# Patient Record
Sex: FEMALE | Race: WHITE | NOT HISPANIC OR LATINO | Employment: FULL TIME | URBAN - METROPOLITAN AREA
[De-identification: names, ages, dates, MRNs, and addresses within clinical notes are randomized per-mention and may not be internally consistent; named-entity substitution may affect disease eponyms.]

---

## 2017-12-26 ENCOUNTER — HISTORICAL (OUTPATIENT)
Dept: ADMINISTRATIVE | Facility: HOSPITAL | Age: 22
End: 2017-12-26

## 2017-12-26 LAB
ALBUMIN SERPL-MCNC: 3.8 GM/DL (ref 3.4–5)
ALBUMIN/GLOB SERPL: 1.1 RATIO (ref 1.1–2)
ALP SERPL-CCNC: 55 UNIT/L (ref 38–126)
ALT SERPL-CCNC: 19 UNIT/L (ref 8–29)
AST SERPL-CCNC: 10 UNIT/L (ref 15–37)
BILIRUB SERPL-MCNC: 0.4 MG/DL (ref 0.2–1)
BILIRUBIN DIRECT+TOT PNL SERPL-MCNC: 0.1 MG/DL (ref 0–0.5)
BILIRUBIN DIRECT+TOT PNL SERPL-MCNC: 0.3 MG/DL (ref 0–0.8)
BUN SERPL-MCNC: 10 MG/DL (ref 7–18)
CALCIUM SERPL-MCNC: 9.4 MG/DL (ref 8.5–10.1)
CHLORIDE SERPL-SCNC: 105 MMOL/L (ref 98–107)
CO2 SERPL-SCNC: 27 MMOL/L (ref 21–32)
CREAT SERPL-MCNC: 0.6 MG/DL (ref 0.55–1.02)
ERYTHROCYTE [DISTWIDTH] IN BLOOD BY AUTOMATED COUNT: 12.7 % (ref 11.5–17)
GLOBULIN SER-MCNC: 3.4 GM/DL (ref 2.4–3.5)
GLUCOSE SERPL-MCNC: 90 MG/DL (ref 74–106)
HCT VFR BLD AUTO: 42.7 % (ref 37–47)
HGB BLD-MCNC: 13.7 GM/DL (ref 12–16)
MCH RBC QN AUTO: 29.2 PG (ref 27–31)
MCHC RBC AUTO-ENTMCNC: 32.1 GM/DL (ref 33–36)
MCV RBC AUTO: 91 FL (ref 80–94)
PLATELET # BLD AUTO: 183 X10(3)/MCL (ref 130–400)
PMV BLD AUTO: 10.1 FL (ref 9.4–12.4)
POTASSIUM SERPL-SCNC: 4.6 MMOL/L (ref 3.5–5.1)
PROT SERPL-MCNC: 7.2 GM/DL (ref 6.4–8.2)
RBC # BLD AUTO: 4.69 X10(6)/MCL (ref 4.2–5.4)
SODIUM SERPL-SCNC: 134 MMOL/L (ref 136–145)
TSH SERPL-ACNC: 0.88 MIU/ML (ref 0.36–3.74)
WBC # SPEC AUTO: 6.1 X10(3)/MCL (ref 4.5–11.5)

## 2018-10-23 ENCOUNTER — TELEPHONE (OUTPATIENT)
Dept: PSYCHIATRY | Facility: CLINIC | Age: 23
End: 2018-10-23

## 2018-10-23 NOTE — TELEPHONE ENCOUNTER
----- Message from Artem Jackson sent at 10/22/2018  4:17 PM CDT -----  Contact: Mother [phone]  Would like to schedule new patient appointment.  Please call back at [phone]. Md Ron

## 2018-10-23 NOTE — TELEPHONE ENCOUNTER
Called and spoke to patient mansoor. Schedules for an [facility] Dr[facility].
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
## 2019-01-02 ENCOUNTER — LAB VISIT (OUTPATIENT)
Dept: LAB | Facility: HOSPITAL | Age: 24
End: 2019-01-02
Attending: PSYCHIATRY & NEUROLOGY
Payer: COMMERCIAL

## 2019-01-02 ENCOUNTER — OFFICE VISIT (OUTPATIENT)
Dept: PSYCHIATRY | Facility: CLINIC | Age: 24
End: 2019-01-02
Payer: COMMERCIAL

## 2019-01-02 DIAGNOSIS — F41.0 GENERALIZED ANXIETY DISORDER WITH PANIC ATTACKS: Primary | ICD-10-CM

## 2019-01-02 DIAGNOSIS — F32.0 CURRENT MILD EPISODE OF MAJOR DEPRESSIVE DISORDER WITHOUT PRIOR EPISODE: ICD-10-CM

## 2019-01-02 DIAGNOSIS — F41.1 GENERALIZED ANXIETY DISORDER WITH PANIC ATTACKS: Primary | ICD-10-CM

## 2019-01-02 LAB
ALBUMIN SERPL BCP-MCNC: 3.4 G/DL
ALP SERPL-CCNC: 55 U/L
ALT SERPL W/O P-5'-P-CCNC: 14 U/L
ANION GAP SERPL CALC-SCNC: 10 MMOL/L
AST SERPL-CCNC: 14 U/L
BASOPHILS # BLD AUTO: 0.04 K/UL
BASOPHILS NFR BLD: 0.6 %
BILIRUB SERPL-MCNC: 0.3 MG/DL
BUN SERPL-MCNC: 7 MG/DL
CALCIUM SERPL-MCNC: 9.5 MG/DL
CHLORIDE SERPL-SCNC: 106 MMOL/L
CO2 SERPL-SCNC: 22 MMOL/L
CREAT SERPL-MCNC: 0.7 MG/DL
DIFFERENTIAL METHOD: ABNORMAL
EOSINOPHIL # BLD AUTO: 0.1 K/UL
EOSINOPHIL NFR BLD: 1.2 %
ERYTHROCYTE [DISTWIDTH] IN BLOOD BY AUTOMATED COUNT: 12 %
EST. GFR  (AFRICAN AMERICAN): >60 ML/MIN/1.73 M^2
EST. GFR  (NON AFRICAN AMERICAN): >60 ML/MIN/1.73 M^2
GLUCOSE SERPL-MCNC: 88 MG/DL
HCT VFR BLD AUTO: 42.8 %
HGB BLD-MCNC: 13.6 G/DL
IMM GRANULOCYTES # BLD AUTO: 0.02 K/UL
IMM GRANULOCYTES NFR BLD AUTO: 0.3 %
LYMPHOCYTES # BLD AUTO: 2.3 K/UL
LYMPHOCYTES NFR BLD: 36.1 %
MCH RBC QN AUTO: 29.6 PG
MCHC RBC AUTO-ENTMCNC: 31.8 G/DL
MCV RBC AUTO: 93 FL
MONOCYTES # BLD AUTO: 0.4 K/UL
MONOCYTES NFR BLD: 6.5 %
NEUTROPHILS # BLD AUTO: 3.6 K/UL
NEUTROPHILS NFR BLD: 55.3 %
NRBC BLD-RTO: 0 /100 WBC
PLATELET # BLD AUTO: 201 K/UL
PMV BLD AUTO: 11.4 FL
POTASSIUM SERPL-SCNC: 4.6 MMOL/L
PROT SERPL-MCNC: 7.1 G/DL
RBC # BLD AUTO: 4.59 M/UL
SODIUM SERPL-SCNC: 138 MMOL/L
TSH SERPL DL<=0.005 MIU/L-ACNC: 0.55 UIU/ML
VIT B12 SERPL-MCNC: 523 PG/ML
WBC # BLD AUTO: 6.43 K/UL

## 2019-01-02 PROCEDURE — 90792 PR PSYCHIATRIC DIAGNOSTIC EVALUATION W/MEDICAL SERVICES: ICD-10-PCS | Mod: S$GLB,,, | Performed by: PSYCHIATRY & NEUROLOGY

## 2019-01-02 PROCEDURE — 82607 VITAMIN B-12: CPT

## 2019-01-02 PROCEDURE — 36415 COLL VENOUS BLD VENIPUNCTURE: CPT | Mod: PO

## 2019-01-02 PROCEDURE — 85025 COMPLETE CBC W/AUTO DIFF WBC: CPT

## 2019-01-02 PROCEDURE — 90792 PSYCH DIAG EVAL W/MED SRVCS: CPT | Mod: S$GLB,,, | Performed by: PSYCHIATRY & NEUROLOGY

## 2019-01-02 PROCEDURE — 80053 COMPREHEN METABOLIC PANEL: CPT

## 2019-01-02 PROCEDURE — 84443 ASSAY THYROID STIM HORMONE: CPT

## 2019-01-02 RX ORDER — CITALOPRAM 20 MG/1
20 TABLET, FILM COATED ORAL
COMMUNITY
End: 2019-01-02

## 2019-01-02 RX ORDER — ESCITALOPRAM OXALATE 10 MG/1
10 TABLET ORAL DAILY
Qty: 30 TABLET | Refills: 11 | Status: SHIPPED | OUTPATIENT
Start: 2019-01-02 | End: 2019-08-13 | Stop reason: SDUPTHER

## 2019-01-02 RX ORDER — NORGESTIMATE AND ETHINYL ESTRADIOL 0.25-0.035
1 KIT ORAL DAILY
Refills: 10 | COMMUNITY
Start: 2018-11-18

## 2019-01-02 RX ORDER — ESCITALOPRAM OXALATE 10 MG/1
10 TABLET ORAL DAILY
COMMUNITY
End: 2019-01-02

## 2019-01-02 NOTE — PATIENT INSTRUCTIONS
Understanding Anxiety Disorders  Almost everyone gets nervous now and then. Its normal to have knots in your stomach before a test, or for your heart to race on a first date. But an anxiety disorder is much more than a case of nerves. In fact, its symptoms may be overwhelming. But treatment can relieve many of these symptoms. Talking to your healthcare provider is the first step.    What are anxiety disorders?  An anxiety disorder causes intense feelings of panic and fear. These feelings may arise for no apparent reason. And they tend to recur again and again. They may prevent you from coping with life and cause you great distress. As a result, you may avoid anything that triggers your fear. In extreme cases, you may never leave the house. Anxiety disorders may cause other symptoms, such as:  · Obsessive thoughts you cant control  · Constant nightmares or painful thoughts of the past  · Nausea, sweating, and muscle tension  · Trouble sleeping or concentrating  What causes anxiety disorders?  Anxiety disorders tend to run in families. For some people, childhood abuse or neglect may play a role. For others, stressful life events or trauma may trigger anxiety disorders. Anxiety can trigger low self-esteem and poor coping skills.  Common anxiety disorders  · Panic disorder. This causes an intense fear of being in danger.  · Phobias. These are extreme fears of certain objects, places, or events.  · Obsessive-compulsive disorder. This causes you to have unwanted thoughts and urges. You also may perform certain actions over and over.  · Posttraumatic stress disorder. This occurs in people who have survived a terrible ordeal. It can cause nightmares and flashbacks about the event.  · Generalized anxiety disorder. This causes constant worry that can greatly disrupt your life.   Getting better  You may believe that nothing can help you. Or, you might fear what others may think. But most anxiety symptoms can be eased.  Having an anxiety disorder is nothing to be ashamed of. Most people do best with treatment that combines medicine and therapy. These arent cures. But they can help you live a healthier life.  Date Last Reviewed: 2/1/2017 © 2000-2017 [employer]. [address] [place] [address], [place] [address]. All rights reserved. This information is not intended as a substitute for professional medical care. Always follow your healthcare professional's instructions.          ADHD and Your Family  Taking care of a child with ADHD might cause other relationships in the household to suffer. This doesnt have to happen. Each member of the family can help build lasting bonds. That way, life can get better for everyone.    How you may feel  If you have a child with ADHD, you may feel guilty, worried, and tired. Try to get enough rest and do some things you enjoy. Ask family and friends for support.  You and your partner  Its easy to blame each other. You may not agree on the childs diagnosis, treatment, or discipline. Finding answers isnt easy, but make an effort to talk each day. Now is the time to build new trust within your relationship.  Nurturing your other children  You may devote a lot of time and effort to the child with ADHD. As a result, your other children may feel left out. Do your best to spend time with your other children, too. Instead of using up your energy, you may find that these moments help build your reserves.  Parents role  · For yourself. Recharge and relax. Free up some time by finding a caregiver who understands ADHD. Ask a counselor or your support group about people who might be able to supervise your child.  · For your marriage. Try to respect any differing opinions. Also, spend time alone as a couple. Talk about things other than your child and coping with ADHD.  · For your other children. Do things with them. Ask about their hobbies, desires, and fears. Let them know they matter to you. Then  help them relate to the child with ADHD.  · Reward everyones efforts to act like a family.  · Counseling may help you manage your stress. It can also help strengthen your marriage and resolve family conflicts.  The future holds promise  Your childs ADHD symptoms are likely to change and evolve as he or she matures. But with time and ongoing guidance, your child can learn to manage his or her traits. Many adults with ADHD are happy and successful.   Date Last Reviewed: 12/1/2016 © 2000-2017 [employer]. [address] [place] [address], [place] [address]. All rights reserved. This information is not intended as a substitute for professional medical care. Always follow your healthcare professional's instructions.          Depression Affects Your Mind and Body  Everyone feels sad or blue from time to time for a few days or weeks. Depression is when these feelings don't go away and they interfere with daily life.  Depression is a real illness that can develop at any age. It is one of the most common mental health problems in the [place] Depression makes you feel sad, helpless, and hopeless. It gets in the way of your life and relationships. It inhibits your ability to think and act. But, with help, you can feel better again.     When I was depressed, I felt awful. I was so tired all the time I could hardly think, but at night I couldnt fall asleep. My head hurt. My stomach hurt. I didnt know what was wrong with me.   Depression affects your whole body  Brain chemicals affect your body as well as your mood. So depression may do more than just make you feel low. You may also feel bad physically. Depression can:  · Cause trouble with mental tasks such as remembering, concentrating, or making decisions  · Make you feel nervous and jumpy  · Cause trouble sleeping. Or you may sleep too much  · Change your appetite  · Cause headaches, stomachaches, or other aches and pains  · Drain your body of energy  Depression and  other illness  It is common for people who have chronic health problems to also have depression. It can often be hard to tell which one caused the other. A person might become depressed after finding out they have a health problem. But some studies suggest being depressed may make certain health problems more likely. And some depressed people stop taking care of themselves. This may make them more likely to get sick.  Date Last Reviewed: 1/1/2017  © 8585-0420 [employer]. [address] [place] [address], [place] [address]. All rights reserved. This information is not intended as a substitute for professional medical care. Always follow your healthcare professional's instructions.          Using Antidepressants  Depression is a mood disorder that affects the way you think and feel. The most common symptom is a feeling of deep sadness. This feeling does not go away or get better on its own. But most types of depression can be helped with therapy and antidepressant medicines. (Note: This covers antidepressant use in adults only.)    What do antidepressants do?  Antidepressants restore the balance of certain chemicals in your brain to help ease your depression. You will likely feel better in 4 to 6 weeks. But you may continue taking antidepressants for a year or more to keep your symptoms from coming back. Some people with depression need to take antidepressants for life. There are several types of antidepressants. The main types are described below.  Selective serotonin reuptake inhibitors (SSRIs)  SSRIs are the most effective medicines for the treatment of depression. They tend to have fewer side effects than other antidepressants. Possible side effects include anxiety, trouble sleeping, nausea, diarrhea, sexual dysfunction, and headaches. In rare cases, they may make you more depressed. SSRIs shouldnt be mixed with certain other medicines. Talk with your healthcare provider about all the medicines, herbs, and  supplements you are taking.  Tricyclic antidepressants  Tricyclics help severe or long-term depression. They have been used for many years with good results. Possible side effects include blurred vision, dry mouth, and constipation.  Monoamine oxidase inhibitors (MAOIs)  If you dont respond to tricyclics or SSRIs, your healthcare provider may prescribe MAOIs. These medicines can be very effective. But people taking MAOIs must stay away from certain foods and medicines. Your healthcare provider can tell you more.  Lithium  If you have bipolar disorder, you may take a medicine called lithium. This medicine helps even out your mood. Possible side effects are weight gain, trembling, loose stool, and nausea. Lithium is also used:  · For people who have unipolar depression and have not responded to other antidepressants  · For people who have a sudden (acute) episode of unipolar depression  · As a maintenance medicine to prevent unipolar depression from happening again  Things to avoid if you are taking MAOIs  If you are taking MAOIs, dont take any of the following:  · Beans  · Aged cheese  · Chocolate  · Red wine  · Most cold medicines  · Certain medicines (ask your healthcare provider)   To reduce the risk of lithium poisoning  You can reduce the risk of lithium poisoning by following this advice:  · Take only the prescribed amount of lithium. If your depressive symptoms get worse, contact your healthcare provider. Never increase your medicine on your own.  · Drink plenty of fluids other than coffee, tea, and soda.  · Limit salt in your diet.  · Before using other prescribed medicines or over-the-counter medicines, check with your pharmacist. This is to be sure the medicines wont interact with the lithium.  · Never share your medicines or use another person's medicines, even if it is the same medicine and dose.   If you have side effects  The side effects of antidepressants are usually mild. But if you have troubling  side effects, call your healthcare provider. Changing the dosage or type of medicine may help. Never stop taking medicines on your own.  Date Last Reviewed: 5/1/2017 © 2000-2017 [employer]. [address] [place] [address], [place] [address]. All rights reserved. This information is not intended as a substitute for professional medical care. Always follow your healthcare professional's instructions.          Escitalopram tablets  What is this medicine?  ESCITALOPRAM (es sye JACE oh pram) is used to treat depression and certain types of anxiety.  How should I use this medicine?  Take this medicine by mouth with a glass of water. Follow the directions on the prescription label. You can take it with or without food. If it upsets your stomach, take it with food. Take your medicine at regular intervals. Do not take it more often than directed. Do not stop taking this medicine suddenly except upon the advice of your doctor. Stopping this medicine too quickly may cause serious side effects or your condition may worsen.  A special MedGuide will be given to you by the pharmacist with each prescription and refill. Be sure to read this information carefully each time.  Talk to your pediatrician regarding the use of this medicine in children. Special care may be needed.  What side effects may I notice from receiving this medicine?  Side effects that you should report to your doctor or health care professional as soon as possible:  · allergic reactions like skin rash, itching or hives, swelling of the face, lips, or tongue  · confusion  · feeling faint or lightheaded, falls  · fast talking and excited feelings or actions that are out of control  · hallucination, loss of contact with reality  · seizures  · suicidal thoughts or other mood changes  · unusual bleeding or bruising  Side effects that usually do not require medical attention (report to your doctor or health care professional if they continue or are  bothersome):  · blurred vision  · changes in appetite  · change in sex drive or performance  · headache  · increased sweating  · nausea  What may interact with this medicine?  Do not take this medicine with any of the following medications:  · certain medicines for fungal infections like fluconazole, itraconazole, ketoconazole, posaconazole, voriconazole  · cisapride  · citalopram  · dofetilide  · dronedarone  · linezolid  · MAOIs like Carbex, Eldepryl, Marplan, Nardil, and Parnate  · methylene blue (injected into a vein)  · pimozide  · thioridazine  · ziprasidone  This medicine may also interact with the following medications:  · alcohol  · aspirin and aspirin-like medicines  · carbamazepine  · certain medicines for depression, anxiety, or psychotic disturbances  · certain medicines for migraine headache like almotriptan, eletriptan, frovatriptan, naratriptan, rizatriptan, sumatriptan, zolmitriptan  · certain medicines for sleep  · certain medicines that treat or prevent blood clots like warfarin, enoxaparin, dalteparin  · cimetidine  · diuretics  · fentanyl  · furazolidone  · isoniazid  · lithium  · metoprolol  · NSAIDs, medicines for pain and inflammation, like ibuprofen or naproxen  · other medicines that prolong the QT interval (cause an abnormal heart rhythm)  · procarbazine  · rasagiline  · supplements like Lavell's wort, kava kava, valerian  · tramadol  · tryptophan  What if I miss a dose?  If you miss a dose, take it as soon as you can. If it is almost time for your next dose, take only that dose. Do not take double or extra doses.  Where should I keep my medicine?  Keep out of reach of children.  Store at room temperature between 15 and 30 degrees C (59 and 86 degrees F). Throw away any unused medicine after the expiration date.  What should I tell my health care provider before I take this medicine?  They need to know if you have any of these conditions:  · bipolar disorder or a family history of  bipolar disorder  · diabetes  · glaucoma  · heart disease  · kidney or liver disease  · receiving electroconvulsive therapy  · seizures (convulsions)  · suicidal thoughts, plans, or attempt by you or a family member  · an unusual or allergic reaction to escitalopram, the related drug citalopram, other medicines, foods, dyes, or preservatives  · pregnant or trying to become pregnant  · breast-feeding  What should I watch for while using this medicine?  Tell your doctor if your symptoms do not get better or if they get worse. Visit your doctor or health care professional for regular checks on your progress. Because it may take several weeks to see the full effects of this medicine, it is important to continue your treatment as prescribed by your doctor.  Patients and their families should watch out for new or worsening thoughts of suicide or depression. Also watch out for sudden changes in feelings such as feeling anxious, agitated, panicky, irritable, hostile, aggressive, impulsive, severely restless, overly excited and hyperactive, or not being able to sleep. If this happens, especially at the beginning of treatment or after a change in dose, call your health care professional.  You may get drowsy or dizzy. Do not drive, use machinery, or do anything that needs mental alertness until you know how this medicine affects you. Do not stand or sit up quickly, especially if you are an older patient. This reduces the risk of dizzy or fainting spells. Alcohol may interfere with the effect of this medicine. Avoid alcoholic drinks.  Your mouth may get dry. Chewing sugarless gum or sucking hard candy, and drinking plenty of water may help. Contact your doctor if the problem does not go away or is severe.  NOTE:This sheet is a summary. It may not cover all possible information. If you have questions about this medicine, talk to your doctor, pharmacist, or health care provider. Copyright© 2017 [employer]

## 2019-01-31 ENCOUNTER — OFFICE VISIT (OUTPATIENT)
Dept: PSYCHIATRY | Facility: CLINIC | Age: 24
End: 2019-01-31
Payer: COMMERCIAL

## 2019-01-31 VITALS — WEIGHT: 124.56 LBS | HEART RATE: 114 BPM | DIASTOLIC BLOOD PRESSURE: 58 MMHG | SYSTOLIC BLOOD PRESSURE: 140 MMHG

## 2019-01-31 DIAGNOSIS — F41.0 GENERALIZED ANXIETY DISORDER WITH PANIC ATTACKS: Primary | ICD-10-CM

## 2019-01-31 DIAGNOSIS — F41.1 GENERALIZED ANXIETY DISORDER WITH PANIC ATTACKS: Primary | ICD-10-CM

## 2019-01-31 PROBLEM — F32.0 CURRENT MILD EPISODE OF MAJOR DEPRESSIVE DISORDER WITHOUT PRIOR EPISODE: Status: RESOLVED | Noted: 2019-01-02 | Resolved: 2019-01-31

## 2019-01-31 PROCEDURE — 99999 PR PBB SHADOW E&M-EST. PATIENT-LVL I: ICD-10-PCS | Mod: PBBFAC,,, | Performed by: PSYCHIATRY & NEUROLOGY

## 2019-01-31 PROCEDURE — 99999 PR PBB SHADOW E&M-EST. PATIENT-LVL I: CPT | Mod: PBBFAC,,, | Performed by: PSYCHIATRY & NEUROLOGY

## 2019-01-31 PROCEDURE — 99212 PR OFFICE/OUTPT VISIT, EST, LEVL II, 10-19 MIN: ICD-10-PCS | Mod: S$GLB,,, | Performed by: PSYCHIATRY & NEUROLOGY

## 2019-01-31 PROCEDURE — 99212 OFFICE O/P EST SF 10 MIN: CPT | Mod: S$GLB,,, | Performed by: PSYCHIATRY & NEUROLOGY

## 2019-01-31 NOTE — PROGRESS NOTES
"ESTABLISHED OUTPATIENT VISIT   E/M LEVEL 2: 56689    ENCOUNTER DATE: 1/31/2019  SITE: [facility], [address]       HISTORY    CHIEF COMPLAINT   Alexa Araujo is a 23 y.o. female who presents for followup of No chief complaint on file.      HPI       Dad had a stroke almost two weeks ago. She thinks that she has been dealing with it. She is giving herself permission to be upset about it, and when she feels that she can't do something extra for her dad, she lets them know that she is unable to do task. She is doing well at work, not as easily flustered. She is sleeping better, thinks that the Lexapro is helping because she can handle stressors better. Although she felt that the Celexa was helping, it was not as effective as the Lexapro. Feels that despite of her current stressors, she is doing well. Mom has also commented that she is not as easily rattled and is able to handle stress better. She recently lost her debit card and normally due to previous financial difficulties she felt that she would have "lost it", but handled the situation as was able to deescalate her anxiety.     Depression: denies depressed mood, lethargy, anhedonia, isolation     Anxiety Symptoms:  Anxiety Disorder Symptoms:       Excessive Anxiety & Worry (occurring more days than not for at least past 6 months) not worrying as much, lets things slide  Difficulty in Controlling Worry -- improving   Sleep disturbance -- sleeping better   Restlessness/psychomotor agitation -- restlessness is better  Fatigues easily -- not as fatigued  Difficulty concentrating/mind going blank -- no problems  Irritability -- still finds that she is irritated for nothing, her boyfriend  Muscle tension/headaches -- improved  GI somatic complaints (e.g., IBS, frequent dyspepsia) -- denied      ROS   History obtained from the patient  Psychological ROS: positive for - anxiety and irritability  negative for - depression, memory difficulties, mood swings, " sleep disturbances or suicidal ideation      PFSH  Past Medical History reviewed: Yes  Family History reviewed: Yes  Social History reviewed: Yes    Allergies:   Review of patient's allergies indicates:  No Known Allergies     PSYCHOTROPIC MEDICATIONS   Current Outpatient Medications on File Prior to Visit   Medication Sig Dispense Refill    escitalopram oxalate (LEXAPRO) 10 MG tablet Take 1 tablet (10 mg total) by mouth once daily. 30 tablet 11    ESTARYLLA 0.25-35 mg-mcg per tablet Take 1 tablet by mouth once daily.  10     No current facility-administered medications on file prior to visit.          EXAMINATION    [unfilled]  Vitals:    01/31/19 1054   BP: (!) 140/58   Pulse: (!) 114     Pulse elevated because she was rushing to get here, she could feel her pulse racing as she rushed to come     CONSTITUTIONAL  General Appearance: casually groomed     MUSCULOSKELETAL  Gait and Station: non ataxic    Psychiatric  Behavior Pleasant and cooperative   Speech:   clear and coherent, regular rate and rhythm   Mood & Affect:  Mood: euthymic, some anxiety, but improving; Affect: broad   Thought Process:  Linear, logical, goal directed   Thought Content:  No delusions, no hallucinations, no si no hi   Insight:  fair   Judgement: intact   Orientation:  Oriented to time, place, person, and situation   Memory: Intact for both recent and remote as evidenced by 3/3 object recall   Language: Intact   Attention Span & Concentration:  Intact to conversation. Capable of doing days of the week backwards   Fund of Knowledge:  Adequate for age and education level        MEDICAL DECISION MAKING    DIAGNOSES  Encounter Diagnosis   Name Primary?    Generalized anxiety disorder with panic attacks Yes       PROBLEM LIST AND MANAGEMENT PLANS    LAVERN  1) Continue Lexapro at 10 mg daily  2) She will pay attention to her anxiety and irritability and will let me know if we need to further adjust her medications by increasing lexapro    ADHD RULE  OUT  1) Patient has appointment and neuro psych clinic for diagnostic clarification. Patient had previously expressed concern over possible ADHD and screening test showed some possible symptoms.    -Discussed 911 for suicidal or homicidal ideation   -Patient will contact clinic with any questions or concerns            PRESCRIPTION DRUG MANAGEMENT  Compliance: YES  Side Effects: NONE  Regimen Adjustments: NONE      DIAGNOSTIC TESTING  NONE    Bozena Simpson

## 2019-08-13 ENCOUNTER — OFFICE VISIT (OUTPATIENT)
Dept: PSYCHIATRY | Facility: CLINIC | Age: 24
End: 2019-08-13
Payer: COMMERCIAL

## 2019-08-13 VITALS — DIASTOLIC BLOOD PRESSURE: 78 MMHG | SYSTOLIC BLOOD PRESSURE: 125 MMHG | WEIGHT: 130.75 LBS | HEART RATE: 77 BPM

## 2019-08-13 DIAGNOSIS — F43.21 GRIEF REACTION: Primary | ICD-10-CM

## 2019-08-13 DIAGNOSIS — F41.0 GENERALIZED ANXIETY DISORDER WITH PANIC ATTACKS: ICD-10-CM

## 2019-08-13 DIAGNOSIS — F41.1 GENERALIZED ANXIETY DISORDER WITH PANIC ATTACKS: ICD-10-CM

## 2019-08-13 PROCEDURE — 99213 OFFICE O/P EST LOW 20 MIN: CPT | Mod: S$GLB,,, | Performed by: PSYCHIATRY & NEUROLOGY

## 2019-08-13 PROCEDURE — 99999 PR PBB SHADOW E&M-EST. PATIENT-LVL II: ICD-10-PCS | Mod: PBBFAC,,, | Performed by: PSYCHIATRY & NEUROLOGY

## 2019-08-13 PROCEDURE — 99999 PR PBB SHADOW E&M-EST. PATIENT-LVL II: CPT | Mod: PBBFAC,,, | Performed by: PSYCHIATRY & NEUROLOGY

## 2019-08-13 PROCEDURE — 99213 PR OFFICE/OUTPT VISIT, EST, LEVL III, 20-29 MIN: ICD-10-PCS | Mod: S$GLB,,, | Performed by: PSYCHIATRY & NEUROLOGY

## 2019-08-13 RX ORDER — ESCITALOPRAM OXALATE 10 MG/1
20 TABLET ORAL DAILY
Qty: 60 TABLET | Refills: 11 | Status: SHIPPED | OUTPATIENT
Start: 2019-08-13 | End: 2019-08-15

## 2019-08-13 NOTE — PATIENT INSTRUCTIONS
Escitalopram tablets  ¿Qué es shanta medicamento?  El ESCITALOPRAM se utiliza para el tratamiento de la depresión y ciertos tipos de ansiedad.  ¿Cómo justina utilizar shanta medicamento?  Shirley shanta medicamento por vía oral con un vaso de agua. Siga las instrucciones de la etiqueta del medicamento. Shanta medicamento se puede kelly con o sin alimentos. Si le produce malestar estomacal, tómelo con alimentos. Shirley misael dosis a intervalos regulares. No tome castro medicamento con fifi frecuencia mayor a la indicada. No deje de kelly shanta medicamento repentinamente a menos que así indique castro médico. El detener shanta medicamento demasiado rápido puede causar efectos secundarios graves o puede empeorar castro condición.   Castro farmacéutico le dará fifi guía del medicamento especial con cada receta y relleno. Asegúrese de leer esta información cada vez cuidadosamente.  Hable con castro pediatra para informarse acerca del uso de shanta medicamento en niños. Puede requerir atención especial.  ¿Qué efectos secundarios puedo tener al utilizar shanta medicamento?  Efectos secundarios que debe informar a castro médico o a castro profesional de la judy tan pronto tricia sea posible:  · reacciones alérgicas tricia erupción cutánea, picazón o urticarias, hinchazón de la marizol, labios o lengua  · confusión  · sensación de desmayos o mareos, caídas  · habla rápidamente y presenta acciones o sentimientos agitados y fuera de control  · alucinaciones, pérdida del contacto con la realidad  · convulsiones  · ideas suicidas u otros cambios de humor  · sangrado, magulladuras inusuales  Efectos secundarios que, por lo general, no requieren atención médica (debe informarlos a castro médico o a castro profesional de la judy si persisten o si son molestos):  · visión borrosa  · cambios del apetito  · cambios en el deseo sexual o capacidad  · dolor de te  · aumento de la sudoración  · náuseas  ¿Qué puede interactuar con shanta medicamento?  No tome esta medicina con ninguno de los siguientes  medicamentos:  · ciertos medicamentos para infecciones micóticas tricia fluconazol, itraconazol, quetoconazol, posaconazol, voriconazol  · cisapride  · citalopram  · dofetilida  · dronedarona  · linezolid  · IMAOs, tales tricia Carbex, Eldepryl, Marplan, Nardil y Parnate  · delmis de metileno (vía intravenosa)  · pimozida  · tioridazina  · ziprasidona  Esta medicina también puede interactuar con los siguientes medicamentos:  · alcohol  · aspirina o medicamentos tipo aspirina  · carbamazepina  · ciertos medicamentos para la depresión, ansiedad o trastornos psicóticos  · ciertos medicamentos para migrañas, tales tricia almotriptán, eletriptán, frovatriptán, naratriptán, rizatriptán, sumatriptán, zolmitriptán  · ciertos medicamentos para dormir  · ciertos medicamentos que tratan o previenen coágulos sanguíneos, tricia warfarina, enoxaparina, dalteparina  · cimetidina  · diuréticos  · fentanilo  · furazolidona  · isoniazida  · litio  · metoprolol  · los VALERIE, medicamentos para el dolor o inflamación, tales tricia ibuprofeno o naproxeno  · otros medicamentos que prolongan el intervalo QT (causa un ritmo cardiaco anormal)  · procarbazina  · rasagilina  · suplementos tricia hierba de [place], kava kava, valeriana  · tramadol  · triptófano  ¿Qué sucede si me olvido de fifi dosis?  Si olvida fifi dosis, tómela lo antes posible. Si es brielle la hora de la próxima dosis, tome sólo truman dosis. No tome dosis adicionales o dobles.  ¿Dónde justina guardar mi medicina?  Mant[place]ngala fuera del alcance de los niños.  Guárdela a temperatura ambiente, entre 15 y 30 grados C (59 y 86 grados F). Deseche todo el medicamento que no haya utilizado, después de la fecha de vencimiento.  ¿Qué le justina informar a mi profesional de la judy antes de kelly esther medicamento?  Necesita saber si usted presenta alguno de los siguientes problemas o situaciones:  · trastorno bipolar o antecedentes familiares del trastorno bipolar  · diabetes  · glaucoma  · enfermedad  cardiaca  · enfermedad renal o hepática  · recibe tratamiento electroconvulsivo  · convulsiones  · ideas suicidas, planes o si usted o alguien de castro josue ha intentado un suicidio previo  · fifi reacción alérgica o inusual al escitalopram, al medicamento relacionado citalopram, a otros medicamentos, alimentos, colorantes o conservadores  · si está embarazada o buscando quedar embarazada  · si está amamantando a un bebé  ¿A qué justina estar atento al usar esther medicamento?  Informe a castro médico si misael síntomas no mejoran o si empeoran. Visite a castro médico o a castro profesional de la judy para chequear castro evolución periódicamente. Debido que puede ser necesario kelly esther medicamento shiv varias semanas para que sea posible observar misael efectos en forma completa, es importante que sigue castro tratamiento tricia recetado por castro médico.   Los pacientes y misael familias deben estar atentos si empeora la depresión o ideas suicidas. También esté atento a cambios repentinos o severos de emoción, tales tricia el sentirse ansioso, agitado, lleno de pánico, irritable, hostil, agresivo, impulsivo, inquietud severa, demasiado excitado y hiperactivo o dificultad para conciliar el sueño. Si esto ocurre, especialmente al comenzar con el tratamiento o al cambiar de dosis, comuníquese con castro profesional de la judy.  Puede experimentar somnolencia o mareos. No conduzca ni utilice maquinaria, ni drake nada que le exija permanecer en estado de alerta hasta que sepa cómo le afecta esther medicamento. No se siente ni se ponga de pie con rapidez, especialmente si es un paciente de edad avanzada. [place] reduce el riesgo de mareos o desmayos. El alcohol puede interferir con el efecto de esther medicamento. Evite consumir bebidas alcohólicas.  Se le podrá secar la boca. Masticar chicle sin azúcar, chupar caramelos duros y beber agua en abundancia le ayudará a mantener la boca húmeda. Si el problema no desaparece o es severo, consulte a castro médico.  © 7495-7299  [employer]. [address] [place] [address], [place] [address]. Todos los derechos reservados. Esta información no pretende sustituir la atención médica profesional. Sólo castro médico puede diagnosticar y tratar un problema de judy.

## 2019-08-13 NOTE — PROGRESS NOTES
"ESTABLISHED OUTPATIENT VISIT   E/M LEVEL 3: [id number]    ENCOUNTER DATE: 8/13/2019  [facility]       HISTORY    From previous visit on 1/31/19  Dad had a stroke almost two weeks ago. She thinks that she has been dealing with it. She is giving herself permission to be upset about it, and when she feels that she can't do something extra for her dad, she lets them know that she is unable to do task. She is doing well at work, not as easily flustered. She is sleeping better, thinks that the Lexapro is helping because she can handle stressors better. Although she felt that the Celexa was helping, it was not as effective as the Lexapro. Feels that despite of her current stressors, she is doing well. Mom has also commented that she is not as easily rattled and is able to handle stress better. She recently lost her debit card and normally due to previous financial difficulties she felt that she would have "lost it", but handled the situation as was able to deescalate her anxiety.       LAVERN  1) Continue Lexapro at 10 mg daily  2) She will pay attention to her anxiety and irritability and will let me know if we need to further adjust her medications by increasing lexapro    ADHD RULE OUT  1) Patient has appointment and neuro psych clinic for diagnostic clarification. Patient had previously expressed concern over possible ADHD and screening test showed some possible symptoms.    CHIEF COMPLAINT   Alexa Araujo is a 24 y.o. female who presents for followup of No chief complaint on file.      HPI   Father passed away in February from stroke. Has been struggling with problems with motivation, and getting up and going usually when her dad's death comes up to the next month. Her anxiety is still controlled, and is able to control her anxiety better. Appetite is good, however, she has been struggling with falling asleep. Recently has been having to reflect more on her dad's passing, but that is because " her boyfriend is away visiting family.        Depression: denies depressed mood, lethargy, anhedonia, isolation     Anxiety Symptoms:  Anxiety Disorder Symptoms:       Excessive Anxiety & Worry (occurring more days than not for at least past 6 months) not worrying as much, lets things slide  Difficulty in Controlling Worry -- improving   Sleep disturbance -- sleeping better   Restlessness/psychomotor agitation -- restlessness is better  Fatigues easily -- not as fatigued  Difficulty concentrating/mind going blank -- no problems  Irritability -- still finds that she is irritated for nothing, her boyfriend  Muscle tension/headaches -- improved  GI somatic complaints (e.g., IBS, frequent dyspepsia) -- denied      ROS   Pertinent symptoms listed in HPI      PFSH  Past Medical History reviewed: Yes  Family History reviewed: Yes  Social History reviewed: Yes    Allergies:   Review of patient's allergies indicates:  No Known Allergies     PSYCHOTROPIC MEDICATIONS   Current Outpatient Medications on File Prior to Visit   Medication Sig Dispense Refill    ESTARYLLA 0.25-35 mg-mcg per tablet Take 1 tablet by mouth once daily.  10    [DISCONTINUED] escitalopram oxalate (LEXAPRO) 10 MG tablet Take 1 tablet (10 mg total) by mouth once daily. 30 tablet 11     No current facility-administered medications on file prior to visit.          EXAMINATION    [unfilled]  Vitals:    08/13/19 1143   BP: 125/78   Pulse: 77     Pulse elevated because she was rushing to get here, she could feel her pulse racing as she rushed to come     CONSTITUTIONAL  General Appearance: casually groomed     MUSCULOSKELETAL  Gait and Station: non ataxic    Psychiatric  Behavior Pleasant and cooperative   Speech:   clear and coherent, regular rate and rhythm   Mood & Affect:  Mood: euthymic, some anxiety, but improving; Affect: broad   Thought Process:  Linear, logical, goal directed   Thought Content:  No delusions, no hallucinations, no si no hi   Insight:   fair   Judgement: intact   Orientation:  Oriented to time, place, person, and situation   Memory: Intact for both recent and remote as evidenced by 3/3 object recall   Language: Intact   Attention Span & Concentration:  Intact to conversation. Capable of doing days of the week backwards   Fund of Knowledge:  Adequate for age and education level        MEDICAL DECISION MAKING    DIAGNOSES  Encounter Diagnoses   Name Primary?    Grief reaction Yes    Generalized anxiety disorder with panic attacks        PROBLEM LIST AND MANAGEMENT PLANS    LAVERN  1) Increase Lexapro 20 mg daily       ADHD RULE OUT  1) Review results of neuropsychological testing performed in January     -Discussed 911 for suicidal or homicidal ideation   -Patient will contact clinic with any questions or concerns            PRESCRIPTION DRUG MANAGEMENT  Compliance: YES  Side Effects: NONE  Regimen Adjustments: NONE      DIAGNOSTIC TESTING  NONE    Bozena Simpson

## 2019-08-15 RX ORDER — ESCITALOPRAM OXALATE 20 MG/1
20 TABLET ORAL DAILY
Qty: 30 TABLET | Refills: 11 | Status: SHIPPED | OUTPATIENT
Start: 2019-08-15 | End: 2019-10-22 | Stop reason: SDUPTHER

## 2019-09-10 ENCOUNTER — OFFICE VISIT (OUTPATIENT)
Dept: PSYCHIATRY | Facility: CLINIC | Age: 24
End: 2019-09-10
Payer: COMMERCIAL

## 2019-09-10 VITALS — HEART RATE: 85 BPM | DIASTOLIC BLOOD PRESSURE: 83 MMHG | WEIGHT: 134.69 LBS | SYSTOLIC BLOOD PRESSURE: 131 MMHG

## 2019-09-10 DIAGNOSIS — F41.0 GENERALIZED ANXIETY DISORDER WITH PANIC ATTACKS: Primary | ICD-10-CM

## 2019-09-10 DIAGNOSIS — F90.2 ATTENTION DEFICIT HYPERACTIVITY DISORDER (ADHD), COMBINED TYPE: ICD-10-CM

## 2019-09-10 DIAGNOSIS — F41.1 GENERALIZED ANXIETY DISORDER WITH PANIC ATTACKS: Primary | ICD-10-CM

## 2019-09-10 PROBLEM — F90.9 ADHD: Status: ACTIVE | Noted: 2019-09-10

## 2019-09-10 PROCEDURE — 99213 PR OFFICE/OUTPT VISIT, EST, LEVL III, 20-29 MIN: ICD-10-PCS | Mod: S$GLB,,, | Performed by: PSYCHIATRY & NEUROLOGY

## 2019-09-10 PROCEDURE — 99999 PR PBB SHADOW E&M-EST. PATIENT-LVL II: CPT | Mod: PBBFAC,,, | Performed by: PSYCHIATRY & NEUROLOGY

## 2019-09-10 PROCEDURE — 99999 PR PBB SHADOW E&M-EST. PATIENT-LVL II: ICD-10-PCS | Mod: PBBFAC,,, | Performed by: PSYCHIATRY & NEUROLOGY

## 2019-09-10 PROCEDURE — 99213 OFFICE O/P EST LOW 20 MIN: CPT | Mod: S$GLB,,, | Performed by: PSYCHIATRY & NEUROLOGY

## 2019-09-10 RX ORDER — DEXTROAMPHETAMINE SACCHARATE, AMPHETAMINE ASPARTATE MONOHYDRATE, DEXTROAMPHETAMINE SULFATE AND AMPHETAMINE SULFATE 2.5; 2.5; 2.5; 2.5 MG/1; MG/1; MG/1; MG/1
10 CAPSULE, EXTENDED RELEASE ORAL EVERY MORNING
Qty: 30 CAPSULE | Refills: 0 | Status: SHIPPED | OUTPATIENT
Start: 2019-09-10 | End: 2019-10-10 | Stop reason: SDUPTHER

## 2019-09-10 NOTE — PROGRESS NOTES
"ESTABLISHED OUTPATIENT VISIT   E/M LEVEL 3: [id number]    ENCOUNTER DATE: 9/10/2019  [facility]       HISTORY    From previous visit on 1/31/19  Dad had a stroke almost two weeks ago. She thinks that she has been dealing with it. She is giving herself permission to be upset about it, and when she feels that she can't do something extra for her dad, she lets them know that she is unable to do task. She is doing well at work, not as easily flustered. She is sleeping better, thinks that the Lexapro is helping because she can handle stressors better. Although she felt that the Celexa was helping, it was not as effective as the Lexapro. Feels that despite of her current stressors, she is doing well. Mom has also commented that she is not as easily rattled and is able to handle stress better. She recently lost her debit card and normally due to previous financial difficulties she felt that she would have "lost it", but handled the situation as was able to deescalate her anxiety.       LAVERN  1) Continue Lexapro at 10 mg daily  2) She will pay attention to her anxiety and irritability and will let me know if we need to further adjust her medications by increasing lexapro    ADHD RULE OUT  1) Patient has appointment and neuro psych clinic for diagnostic clarification. Patient had previously expressed concern over possible ADHD and screening test showed some possible symptoms.      From Treatment Plan   LAVERN  1) Increase Lexapro 20 mg daily       ADHD RULE OUT  1) Review results of neuropsychological testing performed in January     From Previous Visit:     Father passed away in February from stroke. Has been struggling with problems with motivation, and getting up and going usually when her dad's death comes up to the next month. Her anxiety is still controlled, and is able to control her anxiety better. Appetite is good, however, she has been struggling with falling asleep. Recently has been having to " reflect more on her dad's passing, but that is because her boyfriend is away visiting family.           Depression: denies depressed mood, lethargy, anhedonia, isolation      Anxiety Symptoms:  Anxiety Disorder Symptoms:       Excessive Anxiety & Worry (occurring more days than not for at least past 6 months) not worrying as much, lets things slide  Difficulty in Controlling Worry -- improving   Sleep disturbance -- sleeping better   Restlessness/psychomotor agitation -- restlessness is better  Fatigues easily -- not as fatigued  Difficulty concentrating/mind going blank -- no problems  Irritability -- still finds that she is irritated for nothing, her boyfriend  Muscle tension/headaches -- improved  GI somatic complaints (e.g., IBS, frequent dyspepsia) -- denied      Depression: denies depressed mood, lethargy, anhedonia, isolation     Anxiety Symptoms:  Anxiety Disorder Symptoms:       Currently patient is denying any depressive symptoms. Future oriented. Denies depressed mood. Denies lack of motivation. Says that she is future oriented. Anxiety is under control. No suicidal ideation.   Problems with concentration and focus.       CHIEF COMPLAINT   Alexa Araujo is a 24 y.o. female who presents for followup of ADHD and Depression and Anxiety     HPI       Returns for follow up appointment.   No depression, currently does not endorse grief from father's passing  No suicidal ideation  Anxiety is controlled  Denies having any worrying or ruminating thinking  Continues to struggle with problems with attention and focus in school  At this point we have controlled for depression and anxiety and this has not improved her ability to maintain focus when she is in school and continues to struggle with that part in her academics    No problems currently with sleep  Appetite is within normal limits  No problems with syncope  No problems with seizures  No palpitations  No history of exercise intolerance        ROS    Pertinent symptoms listed in [facility]  Past Medical History reviewed: Yes  Family History reviewed: Yes  Social History reviewed: Yes    Allergies:   Review of patient's allergies indicates:  No Known Allergies     PSYCHOTROPIC MEDICATIONS   Current Outpatient Medications on File Prior to Visit   Medication Sig Dispense Refill    escitalopram oxalate (LEXAPRO) 20 MG tablet Take 1 tablet (20 mg total) by mouth once daily. 30 tablet 11    ESTARYLLA 0.25-35 mg-mcg per tablet Take 1 tablet by mouth once daily.  10     No current facility-administered medications on file prior to visit.          EXAMINATION    [unfilled]  Vitals:    09/10/19 0741   BP: 131/83   Pulse: 85         CONSTITUTIONAL  General Appearance: casually groomed     MUSCULOSKELETAL  Gait and Station: non ataxic    Psychiatric  Behavior Pleasant and cooperative   Speech:   clear and coherent, regular rate and rhythm   Mood & Affect:  Mood: euthymic Affect: broad   Thought Process:  Linear, logical, goal directed   Thought Content:  No delusions, no hallucinations, no si no hi   Insight:  fair   Judgement: intact   Orientation:  Oriented to time, place, person, and situation   Memory: Intact for both recent and remote as evidenced by 3/3 object recall   Language: Intact   Attention Span & Concentration:  Intact to conversation. Capable of doing days of the week backwards   Fund of Knowledge:  Adequate for age and education level        MEDICAL DECISION MAKING    DIAGNOSES  Encounter Diagnoses   Name Primary?    Attention deficit hyperactivity disorder (ADHD), combined type     Generalized anxiety disorder with panic attacks Yes       PROBLEM LIST AND MANAGEMENT PLANS      -Continue Lexapro 20 mg daily  -will start Adderall XR 10 mg   -Patient educated about side-effects and responsibilities of having stimulants           the patient educated about looking for any changes in CV symptoms (eg, fainting or dizziness, seizures, chest pain or shortness  of breath with exercise, unexplained change in exercise tolerance, or palpitations).             PRESCRIPTION DRUG MANAGEMENT  Compliance: YES  Side Effects: NONE  Regimen Adjustments: NONE      DIAGNOSTIC TESTING  NONE    Bozena Simpson

## 2019-09-10 NOTE — PATIENT INSTRUCTIONS
Amphetamine; Dextroamphetamine extended-release capsules  What is this medicine?  AMPHETAMINE; DEXTROAMPHETAMINE (am FET a meen; dex troe am FET a meen) is used to treat attention-deficit hyperactivity disorder (ADHD). Federal law prohibits giving this medicine to any person other than the person for whom it was prescribed. Do not share this medicine with anyone else.  How should I use this medicine?  Take this medicine by mouth with a glass of water. Follow the directions on the prescription label. This medicine is taken just one time per day, usually in the morning after waking up. Take with or without food. Do not chew or crush this medicine. You may open the capsules and sprinkle the medicine on a spoonful of applesauce. If sprinkled on applesauce, take the dose immediately and do not crush or chew. Always drink a glass of water or other liquid after taking this medicine. Do not take your medicine more often than directed.  A special MedGuide will be given to you by the pharmacist with each prescription and refill. Be sure to read this information carefully each time.  Talk to your pediatrician regarding the use of this medicine in children. While this drug may be prescribed for children as young as 6 years for selected conditions, precautions do apply.  What side effects may I notice from receiving this medicine?  Side effects that you should report to your doctor or health care professional as soon as possible:  · allergic reactions like skin rash, itching or hives, swelling of the face, lips, or tongue  · changes in vision  · chest pain or chest tightness  · confusion, trouble speaking or understanding  · fast, irregular heartbeat  · fingers or toes feel numb, cool, painful  · hallucination, loss of contact with reality  · high blood pressure  · males: prolonged or painful erection  · seizures  · severe headaches  · shortness of breath  · suicidal thoughts or other mood changes  · trouble walking,  dizziness, loss of balance or coordination  · uncontrollable head, mouth, neck, arm, or leg movements  Side effects that usually do not require medical attention (report to your doctor or health care professional if they continue or are bothersome):  · anxious  · headache  · loss of appetite  · nausea, vomiting  · trouble sleeping  · weight loss  What may interact with this medicine?  Do not take this medicine with any of the following medications:  · MAOIs like Carbex, Eldepryl, Marplan, Nardil, and Parnate  · other stimulant medicines for attention disorders, weight loss, or to stay awake  This medicine may also interact with the following medications:  · acetazolamide  · ammonium chloride  · antacids  · ascorbic acid  · atomoxetine  · caffeine  · certain medicines for blood pressure  · certain medicines for depression, anxiety, or psychotic disturbances  · certain medicines for diabetes  · certain medicines for seizures like carbamazepine, phenobarbital, phenytoin  · certain medicines for stomach problems like cimetidine, famotidine, omeprazole, lansoprazole  · cold or allergy medicines  · glutamic acid  · lithium  · meperidine  · methenamine; sodium acid phosphate  · narcotic medicines for pain  · norepinephrine  · phenothiazines like chlorpromazine, mesoridazine, prochlorperazine, thioridazine  · sodium bicarbonate  What if I miss a dose?  If you miss a dose, take it as soon as you can. If it is almost time for your next dose, take only that dose. Do not take double or extra doses.  Where should I keep my medicine?  Keep out of the reach of children. This medicine can be abused. Keep your medicine in a safe place to protect it from theft. Do not share this medicine with anyone. Selling or giving away this medicine is dangerous and against the law.  Store at room temperature between 15 and 30 degrees C (59 and 86 degrees F). Keep container tightly closed. Protect from light. Throw away any unused medicine after  the expiration date.  What should I tell my health care provider before I take this medicine?  They need to know if you have any of these conditions:  · anxiety or panic attacks  · circulation problems in fingers and toes  · glaucoma  · hardening or blockages of the arteries or heart blood vessels  · heart disease or a heart defect  · high blood pressure  · history of a drug or alcohol abuse problem  · history of stroke  · kidney disease  · liver disease  · mental illness  · seizures  · suicidal thoughts, plans, or attempt; a previous suicide attempt by you or a family member  · thyroid disease  · Tourette's syndrome  · an unusual or allergic reaction to dextroamphetamine, other amphetamines, other medicines, foods, dyes, or preservatives  · pregnant or trying to get pregnant  · breast-feeding  What should I watch for while using this medicine?  Visit your doctor or health care professional for regular checks on your progress. This prescription requires that you follow special procedures with your doctor and pharmacy. You will need to have a new written prescription from your doctor every time you need a refill.  This medicine may affect your concentration, or hide signs of tiredness. Until you know how this medicine affects you, do not drive, ride a bicycle, use machinery, or do anything that needs mental alertness.  Tell your doctor or health care professional if this medicine loses its effects, or if you feel you need to take more than the prescribed amount. Do not change the dosage without talking to your doctor or health care professional.  Decreased appetite is a common side effect when starting this medicine. Eating small, frequent meals or snacks can help. Talk to your doctor if you continue to have poor eating habits. Height and weight growth of a child taking this medicine will be monitored closely.  Do not take this medicine close to bedtime. It may prevent you from sleeping.  If you are going to need  surgery, an MRI, a CT scan, or other procedure, tell your doctor that you are taking this medicine. You may need to stop taking this medicine before the procedure.  Tell your doctor or healthcare professional right away if you notice unexplained wounds on your fingers and toes while taking this medicine. You should also tell your healthcare provider if you experience numbness or pain, changes in the skin color, or sensitivity to temperature in your fingers or toes.  NOTE:This sheet is a summary. It may not cover all possible information. If you have questions about this medicine, talk to your doctor, pharmacist, or health care provider. Copyright© 2017 [employer]

## 2019-10-07 ENCOUNTER — TELEPHONE (OUTPATIENT)
Dept: PSYCHIATRY | Facility: CLINIC | Age: 24
End: 2019-10-07

## 2019-10-07 NOTE — TELEPHONE ENCOUNTER
----- Message from Liset Weaver sent at 10/7/2019  3:12 PM CDT -----  Contact: self  needs to schedule for medication followup....[phone] (btqs)

## 2019-10-09 ENCOUNTER — PATIENT MESSAGE (OUTPATIENT)
Dept: PSYCHIATRY | Facility: CLINIC | Age: 24
End: 2019-10-09

## 2019-10-10 RX ORDER — DEXTROAMPHETAMINE SACCHARATE, AMPHETAMINE ASPARTATE MONOHYDRATE, DEXTROAMPHETAMINE SULFATE AND AMPHETAMINE SULFATE 2.5; 2.5; 2.5; 2.5 MG/1; MG/1; MG/1; MG/1
10 CAPSULE, EXTENDED RELEASE ORAL EVERY MORNING
Qty: 30 CAPSULE | Refills: 0 | Status: SHIPPED | OUTPATIENT
Start: 2019-10-10 | End: 2019-10-22 | Stop reason: SDUPTHER

## 2019-10-22 ENCOUNTER — OFFICE VISIT (OUTPATIENT)
Dept: PSYCHIATRY | Facility: CLINIC | Age: 24
End: 2019-10-22
Payer: COMMERCIAL

## 2019-10-22 VITALS — DIASTOLIC BLOOD PRESSURE: 80 MMHG | HEART RATE: 92 BPM | WEIGHT: 125.88 LBS | SYSTOLIC BLOOD PRESSURE: 122 MMHG

## 2019-10-22 DIAGNOSIS — F43.21 GRIEF: ICD-10-CM

## 2019-10-22 DIAGNOSIS — F41.0 GENERALIZED ANXIETY DISORDER WITH PANIC ATTACKS: ICD-10-CM

## 2019-10-22 DIAGNOSIS — F90.0 ATTENTION DEFICIT HYPERACTIVITY DISORDER (ADHD), PREDOMINANTLY INATTENTIVE TYPE: Primary | ICD-10-CM

## 2019-10-22 DIAGNOSIS — F41.1 GENERALIZED ANXIETY DISORDER WITH PANIC ATTACKS: ICD-10-CM

## 2019-10-22 PROCEDURE — 99999 PR PBB SHADOW E&M-EST. PATIENT-LVL I: CPT | Mod: PBBFAC,,, | Performed by: PSYCHIATRY & NEUROLOGY

## 2019-10-22 PROCEDURE — 99999 PR PBB SHADOW E&M-EST. PATIENT-LVL I: ICD-10-PCS | Mod: PBBFAC,,, | Performed by: PSYCHIATRY & NEUROLOGY

## 2019-10-22 PROCEDURE — 99213 PR OFFICE/OUTPT VISIT, EST, LEVL III, 20-29 MIN: ICD-10-PCS | Mod: S$GLB,,, | Performed by: PSYCHIATRY & NEUROLOGY

## 2019-10-22 PROCEDURE — 99213 OFFICE O/P EST LOW 20 MIN: CPT | Mod: S$GLB,,, | Performed by: PSYCHIATRY & NEUROLOGY

## 2019-10-22 RX ORDER — DEXTROAMPHETAMINE SACCHARATE, AMPHETAMINE ASPARTATE MONOHYDRATE, DEXTROAMPHETAMINE SULFATE AND AMPHETAMINE SULFATE 2.5; 2.5; 2.5; 2.5 MG/1; MG/1; MG/1; MG/1
10 CAPSULE, EXTENDED RELEASE ORAL EVERY MORNING
Qty: 30 CAPSULE | Refills: 0 | Status: SHIPPED | OUTPATIENT
Start: 2019-11-21 | End: 2020-02-06

## 2019-10-22 RX ORDER — ESCITALOPRAM OXALATE 20 MG/1
20 TABLET ORAL DAILY
Qty: 30 TABLET | Refills: 11 | Status: SHIPPED | OUTPATIENT
Start: 2019-10-22 | End: 2020-11-05 | Stop reason: SDUPTHER

## 2019-10-22 RX ORDER — DEXTROAMPHETAMINE SACCHARATE, AMPHETAMINE ASPARTATE MONOHYDRATE, DEXTROAMPHETAMINE SULFATE AND AMPHETAMINE SULFATE 2.5; 2.5; 2.5; 2.5 MG/1; MG/1; MG/1; MG/1
10 CAPSULE, EXTENDED RELEASE ORAL EVERY MORNING
Qty: 30 CAPSULE | Refills: 0 | Status: SHIPPED | OUTPATIENT
Start: 2019-12-21 | End: 2020-02-03 | Stop reason: SDUPTHER

## 2019-10-22 RX ORDER — DEXTROAMPHETAMINE SACCHARATE, AMPHETAMINE ASPARTATE MONOHYDRATE, DEXTROAMPHETAMINE SULFATE AND AMPHETAMINE SULFATE 2.5; 2.5; 2.5; 2.5 MG/1; MG/1; MG/1; MG/1
10 CAPSULE, EXTENDED RELEASE ORAL EVERY MORNING
Qty: 30 CAPSULE | Refills: 0 | Status: SHIPPED | OUTPATIENT
Start: 2019-10-22 | End: 2020-02-06

## 2019-10-22 NOTE — PROGRESS NOTES
Finds that since she started the medication she has not been distracted.  Has passed all of her first round of exams.  She has been doing well   Doesn't feel the need that she has to increase  She has had two episodes of grief, she has been grief  Anxiety not worrying and not stressing   [place]  She is able to prioritize  No noticeable changes to her appetite   Sleep is good: some nights will take a little longer to simple.   Finally can work       Outpatient Psychiatry Follow-Up Visit (MD/NP)    10/22/2019    Clinical Status of Patient:  Outpatient (Ambulatory)    Chief Complaint:  Alexa Araujo is a 24 y.o. female who presents today for follow-up of depression, anxiety and attention problems.  Met with patient.      Interval History and Content of Current Session:  Interim Events/Subjective Report/Content of Current Session:   Finds that since she started the medication she has not been distracted.  Has passed all of her first round of exams.  She has been doing well   Doesn't feel the need that she has to increase  She has had two episodes of grief, she has been grief  Anxiety not worrying and not stressing   [place]  She is able to prioritize  No noticeable changes to her appetite   Sleep is good: some nights will take a little longer to simple.   No syncopal episodes, no seizures, no changes in exercise tolerance    Review of Systems   · PSYCHIATRIC: Pertinant items are noted in the narrative.    Past Medical, Family and Social History: The patient's past medical, family and social history have been reviewed and updated as appropriate within the electronic medical record - see encounter notes.    No changes from previous visit     Compliance: yes    Side effects: None    Risk Parameters:  Patient reports no suicidal ideation  Patient reports no homicidal ideation  Patient reports no self-injurious behavior  Patient reports no violent behavior    Exam  (detailed: at least 9 elements; comprehensive: all 15 elements)   Constitutional  Vitals:  Most recent vital signs, dated less than 90 days prior to this appointment, were reviewed.   Vitals:    10/22/19 1144   BP: 122/80   Pulse: 92   Weight: 57.1 kg (125 lb 14.1 oz)        General:  unremarkable, age appropriate     Musculoskeletal  Muscle Strength/Tone:  not examined   Gait & Station:  non-ataxic     Psychiatric  Speech:  no latency; no press   Mood & Affect:  euthymic  congruent and appropriate   Thought Process:  normal and logical   Associations:  intact   Thought Content:  normal, no suicidality, no homicidality, delusions, or paranoia   Insight:  intact   Judgement: behavior is adequate to circumstances   Orientation:  grossly intact   Memory: intact for content of interview   Language: grossly intact   Attention Span & Concentration:  able to focus   Fund of Knowledge:  intact and appropriate to age and level of education     Assessment and Diagnosis   Status/Progress: Based on the examination today, the patient's problem(s) is/are improved.  New problems have not been presented today.   Co-morbidities, Diagnostic uncertainty and Lack of compliance are not complicating management of the primary condition.  There are no active rule-out diagnoses for this patient at this time.     General Impression:       ICD-10-CM ICD-9-CM   1. Attention deficit hyperactivity disorder (ADHD), predominantly inattentive type F90.0 314.00   2. Generalized anxiety disorder with panic attacks F41.1 300.02    F41.0 300.01   3. Grief F43.21 309.0       Intervention/Counseling/Treatment Plan   · Medication Management: The risks and benefits of medication were discussed with the patient.    Orders Placed This Encounter    dextroamphetamine-amphetamine (ADDERALL XR) 10 MG 24 hr capsule    escitalopram oxalate (LEXAPRO) 20 MG tablet    dextroamphetamine-amphetamine (ADDERALL XR) 10 MG 24 hr capsule    dextroamphetamine-amphetamine  (ADDERALL XR) 10 MG 24 hr capsule       Return to Clinic: 2 months

## 2019-11-18 ENCOUNTER — PATIENT MESSAGE (OUTPATIENT)
Dept: PSYCHIATRY | Facility: CLINIC | Age: 24
End: 2019-11-18

## 2020-02-05 RX ORDER — DEXTROAMPHETAMINE SACCHARATE, AMPHETAMINE ASPARTATE MONOHYDRATE, DEXTROAMPHETAMINE SULFATE AND AMPHETAMINE SULFATE 2.5; 2.5; 2.5; 2.5 MG/1; MG/1; MG/1; MG/1
10 CAPSULE, EXTENDED RELEASE ORAL EVERY MORNING
Qty: 30 CAPSULE | Refills: 0 | Status: SHIPPED | OUTPATIENT
Start: 2020-02-05 | End: 2020-02-06 | Stop reason: SDUPTHER

## 2020-02-06 ENCOUNTER — OFFICE VISIT (OUTPATIENT)
Dept: PSYCHIATRY | Facility: CLINIC | Age: 25
End: 2020-02-06
Payer: COMMERCIAL

## 2020-02-06 DIAGNOSIS — F90.0 ATTENTION DEFICIT HYPERACTIVITY DISORDER (ADHD), PREDOMINANTLY INATTENTIVE TYPE: Primary | ICD-10-CM

## 2020-02-06 DIAGNOSIS — F43.21 GRIEF: ICD-10-CM

## 2020-02-06 DIAGNOSIS — F41.0 GENERALIZED ANXIETY DISORDER WITH PANIC ATTACKS: ICD-10-CM

## 2020-02-06 DIAGNOSIS — F41.1 GENERALIZED ANXIETY DISORDER WITH PANIC ATTACKS: ICD-10-CM

## 2020-02-06 PROCEDURE — 99214 OFFICE O/P EST MOD 30 MIN: CPT | Mod: S$GLB,,, | Performed by: PSYCHIATRY & NEUROLOGY

## 2020-02-06 PROCEDURE — 99214 PR OFFICE/OUTPT VISIT, EST, LEVL IV, 30-39 MIN: ICD-10-PCS | Mod: S$GLB,,, | Performed by: PSYCHIATRY & NEUROLOGY

## 2020-02-06 RX ORDER — DEXTROAMPHETAMINE SACCHARATE, AMPHETAMINE ASPARTATE MONOHYDRATE, DEXTROAMPHETAMINE SULFATE AND AMPHETAMINE SULFATE 2.5; 2.5; 2.5; 2.5 MG/1; MG/1; MG/1; MG/1
10 CAPSULE, EXTENDED RELEASE ORAL EVERY MORNING
Qty: 30 CAPSULE | Refills: 0 | Status: SHIPPED | OUTPATIENT
Start: 2020-04-06 | End: 2020-12-01

## 2020-02-06 RX ORDER — DEXTROAMPHETAMINE SACCHARATE, AMPHETAMINE ASPARTATE MONOHYDRATE, DEXTROAMPHETAMINE SULFATE AND AMPHETAMINE SULFATE 2.5; 2.5; 2.5; 2.5 MG/1; MG/1; MG/1; MG/1
10 CAPSULE, EXTENDED RELEASE ORAL EVERY MORNING
Qty: 30 CAPSULE | Refills: 0 | Status: SHIPPED | OUTPATIENT
Start: 2020-03-07 | End: 2020-03-12 | Stop reason: SDUPTHER

## 2020-02-06 NOTE — PROGRESS NOTES
Outpatient Psychiatry Follow-Up Visit (MD/NP)    2/6/2020    Clinical Status of Patient:  Outpatient (Ambulatory)    Chief Complaint:  Alexa Araujo is a 24 y.o. female who presents today for follow-up of depression, anxiety and attention problems.  Met with patient.      Interval History and Content of Current Session:  Interim Events/Subjective Report/Content of Current Session:   She has been doing well with school   for the anniversary of her father's death she feels that she has been doing fairly well, but knows that it is likely to be hard for her on the actual date of his death  She has been going to work and staying busy with work and school  No problems with motivation  Future oriented  No feelings of hopelessness or worthlessness  She has been doing less rumination, and less worrying.  She is able to concentrate on Adderall xr 10   Plans to do some Grief counseling during the summer.   No problems with concentration and no problems with decision making  No negative side-effects on medication  Future oriented.   Review of Systems   · PSYCHIATRIC: Pertinant items are noted in the narrative.    Past Medical, Family and Social History: The patient's past medical, family and social history have been reviewed and updated as appropriate within the electronic medical record - see encounter notes.    No changes from previous visit     Compliance: yes    Side effects: None    Risk Parameters:  Patient reports no suicidal ideation  Patient reports no homicidal ideation  Patient reports no self-injurious behavior  Patient reports no violent behavior    Exam (detailed: at least 9 elements; comprehensive: all 15 elements)   Constitutional  Vitals:  Most recent vital signs, dated less than 90 days prior to this appointment, were reviewed.   There were no vitals filed for this visit.     General:  unremarkable, age appropriate     Musculoskeletal  Muscle Strength/Tone:  not examined   Gait & Station:  non-ataxic      Psychiatric  Speech:  no latency; no press   Mood & Affect:  euthymic  congruent and appropriate   Thought Process:  normal and logical   Associations:  intact   Thought Content:  suicidal thoughts: (active-no, passive-no), homicidal thoughts: (active-no, passive-no)   Insight:  intact   Judgement: behavior is adequate to circumstances   Orientation:  grossly intact   Memory: intact for content of interview   Language: grossly intact   Attention Span & Concentration:  able to focus   Fund of Knowledge:  intact and appropriate to age and level of education     Assessment and Diagnosis   Status/Progress: Based on the examination today, the patient's problem(s) is/are improved.  New problems have not been presented today.   Co-morbidities, Diagnostic uncertainty and Lack of compliance are not complicating management of the primary condition.  There are no active rule-out diagnoses for this patient at this time.     General Impression:       ICD-10-CM ICD-9-CM   1. Attention deficit hyperactivity disorder (ADHD), predominantly inattentive type F90.0 314.00   2. Generalized anxiety disorder with panic attacks F41.1 300.02    F41.0 300.01   3. Grief F43.21 309.0       Intervention/Counseling/Treatment Plan   · Medication Management: The risks and benefits of medication were discussed with the patient.  Orders Placed This Encounter    dextroamphetamine-amphetamine (ADDERALL XR) 10 MG 24 hr capsule    dextroamphetamine-amphetamine (ADDERALL XR) 10 MG 24 hr capsule         Return to Clinic: 1 month

## 2020-02-10 ENCOUNTER — PATIENT MESSAGE (OUTPATIENT)
Dept: PSYCHIATRY | Facility: CLINIC | Age: 25
End: 2020-02-10

## 2020-02-11 ENCOUNTER — PATIENT MESSAGE (OUTPATIENT)
Dept: PSYCHIATRY | Facility: CLINIC | Age: 25
End: 2020-02-11

## 2020-02-17 PROBLEM — F43.21 GRIEF: Status: ACTIVE | Noted: 2020-02-17

## 2020-03-12 RX ORDER — DEXTROAMPHETAMINE SACCHARATE, AMPHETAMINE ASPARTATE MONOHYDRATE, DEXTROAMPHETAMINE SULFATE AND AMPHETAMINE SULFATE 2.5; 2.5; 2.5; 2.5 MG/1; MG/1; MG/1; MG/1
10 CAPSULE, EXTENDED RELEASE ORAL EVERY MORNING
Qty: 30 CAPSULE | Refills: 0 | Status: SHIPPED | OUTPATIENT
Start: 2020-03-12 | End: 2020-11-05 | Stop reason: SDUPTHER

## 2020-03-25 ENCOUNTER — PATIENT MESSAGE (OUTPATIENT)
Dept: PSYCHIATRY | Facility: CLINIC | Age: 25
End: 2020-03-25

## 2020-04-01 ENCOUNTER — TELEPHONE (OUTPATIENT)
Dept: PSYCHIATRY | Facility: CLINIC | Age: 25
End: 2020-04-01

## 2020-04-01 ENCOUNTER — PATIENT MESSAGE (OUTPATIENT)
Dept: PSYCHIATRY | Facility: CLINIC | Age: 25
End: 2020-04-01

## 2020-11-01 ENCOUNTER — PATIENT MESSAGE (OUTPATIENT)
Dept: PSYCHIATRY | Facility: CLINIC | Age: 25
End: 2020-11-01

## 2020-11-04 ENCOUNTER — PATIENT MESSAGE (OUTPATIENT)
Dept: PSYCHIATRY | Facility: CLINIC | Age: 25
End: 2020-11-04

## 2020-11-05 ENCOUNTER — PATIENT MESSAGE (OUTPATIENT)
Dept: PSYCHIATRY | Facility: CLINIC | Age: 25
End: 2020-11-05

## 2020-11-06 RX ORDER — ESCITALOPRAM OXALATE 20 MG/1
20 TABLET ORAL DAILY
Qty: 30 TABLET | Refills: 11 | Status: SHIPPED | OUTPATIENT
Start: 2020-11-06 | End: 2020-12-01

## 2020-11-06 RX ORDER — DEXTROAMPHETAMINE SACCHARATE, AMPHETAMINE ASPARTATE MONOHYDRATE, DEXTROAMPHETAMINE SULFATE AND AMPHETAMINE SULFATE 2.5; 2.5; 2.5; 2.5 MG/1; MG/1; MG/1; MG/1
10 CAPSULE, EXTENDED RELEASE ORAL EVERY MORNING
Qty: 30 CAPSULE | Refills: 0 | Status: SHIPPED | OUTPATIENT
Start: 2020-11-06 | End: 2020-12-01

## 2020-12-01 ENCOUNTER — OFFICE VISIT (OUTPATIENT)
Dept: PSYCHIATRY | Facility: CLINIC | Age: 25
End: 2020-12-01
Payer: COMMERCIAL

## 2020-12-01 DIAGNOSIS — F90.0 ATTENTION DEFICIT HYPERACTIVITY DISORDER (ADHD), PREDOMINANTLY INATTENTIVE TYPE: Primary | ICD-10-CM

## 2020-12-01 DIAGNOSIS — F43.21 GRIEF: ICD-10-CM

## 2020-12-01 DIAGNOSIS — F41.0 GENERALIZED ANXIETY DISORDER WITH PANIC ATTACKS: ICD-10-CM

## 2020-12-01 DIAGNOSIS — F41.1 GENERALIZED ANXIETY DISORDER WITH PANIC ATTACKS: ICD-10-CM

## 2020-12-01 PROCEDURE — 99214 PR OFFICE/OUTPT VISIT, EST, LEVL IV, 30-39 MIN: ICD-10-PCS | Mod: 95,,, | Performed by: PSYCHIATRY & NEUROLOGY

## 2020-12-01 PROCEDURE — 99214 OFFICE O/P EST MOD 30 MIN: CPT | Mod: 95,,, | Performed by: PSYCHIATRY & NEUROLOGY

## 2020-12-01 RX ORDER — ESCITALOPRAM OXALATE 20 MG/1
20 TABLET ORAL DAILY
Qty: 30 TABLET | Refills: 11 | Status: SHIPPED | OUTPATIENT
Start: 2020-12-01 | End: 2021-11-02 | Stop reason: SDUPTHER

## 2020-12-01 RX ORDER — DEXTROAMPHETAMINE SACCHARATE, AMPHETAMINE ASPARTATE MONOHYDRATE, DEXTROAMPHETAMINE SULFATE AND AMPHETAMINE SULFATE 2.5; 2.5; 2.5; 2.5 MG/1; MG/1; MG/1; MG/1
10 CAPSULE, EXTENDED RELEASE ORAL EVERY MORNING
Qty: 30 CAPSULE | Refills: 0 | Status: SHIPPED | OUTPATIENT
Start: 2020-12-31 | End: 2021-02-08

## 2020-12-01 RX ORDER — DEXTROAMPHETAMINE SACCHARATE, AMPHETAMINE ASPARTATE MONOHYDRATE, DEXTROAMPHETAMINE SULFATE AND AMPHETAMINE SULFATE 2.5; 2.5; 2.5; 2.5 MG/1; MG/1; MG/1; MG/1
10 CAPSULE, EXTENDED RELEASE ORAL EVERY MORNING
Qty: 30 CAPSULE | Refills: 0 | Status: SHIPPED | OUTPATIENT
Start: 2020-12-01 | End: 2021-02-08

## 2020-12-01 NOTE — PROGRESS NOTES
Outpatient Psychiatry Follow-Up Visit (MD/NP)    12/1/2020    Clinical Status of Patient:  Outpatient (Ambulatory)      The patient location is:  Patient Home   The chief complaint leading to consultation is: anxiety and ADHD   Visit type: Virtual visit with synchronous audio and video  Total time spent with patient: 20  Each patient to whom he or she provides medical services by telemedicine is:  (1) informed of the relationship between the physician and patient and the respective role of any other health care provider with respect to management of the patient; and (2) notified that he or she may decline to receive medical services by telemedicine and may withdraw from such care at any time.       Chief Complaint:  Alexa Araujo is a 25 y.o. female who presents today for follow-up of depression, anxiety and attention problems.  Met with patient.      Interval History and Content of Current Session:  Interim Events/Subjective Report/Content of Current Session:   She has been doing well with school  All the holidays are hitting harder this time of year   She finds that she will get waves of grief     She is having a difficult time with letting of anger  No suicidal ideation   No homicidal ideation    GAD7 11/30/2020   1. Feeling nervous, anxious, or on edge? 1   2. Not being able to stop or control worrying? 0   3. Worrying too much about different things? 0   4. Trouble relaxing? 1   5. Being so restless that it is hard to sit still? 0   6. Becoming easily annoyed or irritable? 0   7. Feeling afraid as if something awful might happen? 0   LAVERN-7 Score 2         LAVERN-7 Score: (P) 2  Interpretation: (P) Normal    Little interest or pleasure in doing things: (P) Several days  Feeling down, depressed, or hopeless: (P) Several days  Trouble falling or staying asleep, or sleeping too much: (P) Several days  Feeling tired or having little energy: (P) Several days  Poor appetite or overeating: (P) Not at all  Feeling  bad about yourself - or that you are a failure or have let yourself or your family down: (P) Several days  Trouble concentrating on things, such as reading the newspaper or watching television: (P) Several days  Moving or speaking so slowly that other people could have noticed. Or the opposite - being so fidgety or restless that you have been moving around a lot more than usual: (P) Not at all  PHQ-9 Total Score: (P) 6         Review of Systems   · PSYCHIATRIC: Pertinant items are noted in the narrative.    Past Medical, Family and Social History: The patient's past medical, family and social history have been reviewed and updated as appropriate within the electronic medical record - see encounter notes.    No changes from previous visit     Compliance: yes    Side effects: None    Risk Parameters:  Patient reports no suicidal ideation  Patient reports no homicidal ideation  Patient reports no self-injurious behavior  Patient reports no violent behavior    Exam (detailed: at least 9 elements; comprehensive: all 15 elements)   Constitutional  Vitals:  Most recent vital signs, dated less than 90 days prior to this appointment, were reviewed.   There were no vitals filed for this visit.     General:  unremarkable, age appropriate     Musculoskeletal  Muscle Strength/Tone:  not examined   Gait & Station:  non-ataxic     Psychiatric  Speech:  no latency; no press   Mood & Affect:  Some depression and some grief   congruent and appropriate   Thought Process:  normal and logical   Associations:  intact   Thought Content:  suicidal thoughts: (active-no, passive-no), homicidal thoughts: (active-no, passive-no)   Insight:  intact   Judgement: behavior is adequate to circumstances   Orientation:  grossly intact   Memory: intact for content of interview   Language: grossly intact   Attention Span & Concentration:  able to focus   Fund of Knowledge:  intact and appropriate to age and level of education     Assessment and  Diagnosis   Status/Progress: Based on the examination today, the patient's problem(s) is/are improved.  New problems have not been presented today.   Co-morbidities, Diagnostic uncertainty and Lack of compliance are not complicating management of the primary condition.  There are no active rule-out diagnoses for this patient at this time.     General Impression:       ICD-10-CM ICD-9-CM   1. Attention deficit hyperactivity disorder (ADHD), predominantly inattentive type  F90.0 314.00   2. Generalized anxiety disorder with panic attacks  F41.1 300.02    F41.0 300.01   3. Grief  F43.21 309.0       Intervention/Counseling/Treatment Plan   · Medication Management: The risks and benefits of medication were discussed with the patient.   ·       Return to Clinic: 1 month

## 2021-02-08 ENCOUNTER — OFFICE VISIT (OUTPATIENT)
Dept: PSYCHIATRY | Facility: CLINIC | Age: 26
End: 2021-02-08
Payer: COMMERCIAL

## 2021-02-08 DIAGNOSIS — F43.21 GRIEF: ICD-10-CM

## 2021-02-08 DIAGNOSIS — F41.0 GENERALIZED ANXIETY DISORDER WITH PANIC ATTACKS: ICD-10-CM

## 2021-02-08 DIAGNOSIS — F90.0 ATTENTION DEFICIT HYPERACTIVITY DISORDER (ADHD), PREDOMINANTLY INATTENTIVE TYPE: Primary | ICD-10-CM

## 2021-02-08 DIAGNOSIS — F41.1 GENERALIZED ANXIETY DISORDER WITH PANIC ATTACKS: ICD-10-CM

## 2021-02-08 PROCEDURE — 99214 PR OFFICE/OUTPT VISIT, EST, LEVL IV, 30-39 MIN: ICD-10-PCS | Mod: 95,,, | Performed by: PSYCHIATRY & NEUROLOGY

## 2021-02-08 PROCEDURE — 99214 OFFICE O/P EST MOD 30 MIN: CPT | Mod: 95,,, | Performed by: PSYCHIATRY & NEUROLOGY

## 2021-02-08 RX ORDER — DEXTROAMPHETAMINE SACCHARATE, AMPHETAMINE ASPARTATE MONOHYDRATE, DEXTROAMPHETAMINE SULFATE AND AMPHETAMINE SULFATE 2.5; 2.5; 2.5; 2.5 MG/1; MG/1; MG/1; MG/1
10 CAPSULE, EXTENDED RELEASE ORAL EVERY MORNING
Qty: 30 CAPSULE | Refills: 0 | Status: SHIPPED | OUTPATIENT
Start: 2021-03-10 | End: 2021-11-02

## 2021-02-08 RX ORDER — DEXTROAMPHETAMINE SACCHARATE, AMPHETAMINE ASPARTATE MONOHYDRATE, DEXTROAMPHETAMINE SULFATE AND AMPHETAMINE SULFATE 2.5; 2.5; 2.5; 2.5 MG/1; MG/1; MG/1; MG/1
10 CAPSULE, EXTENDED RELEASE ORAL EVERY MORNING
Qty: 30 CAPSULE | Refills: 0 | Status: SHIPPED | OUTPATIENT
Start: 2021-02-08 | End: 2021-06-08 | Stop reason: SDUPTHER

## 2021-02-08 RX ORDER — DEXTROAMPHETAMINE SACCHARATE, AMPHETAMINE ASPARTATE MONOHYDRATE, DEXTROAMPHETAMINE SULFATE AND AMPHETAMINE SULFATE 2.5; 2.5; 2.5; 2.5 MG/1; MG/1; MG/1; MG/1
10 CAPSULE, EXTENDED RELEASE ORAL EVERY MORNING
Qty: 30 CAPSULE | Refills: 0 | Status: SHIPPED | OUTPATIENT
Start: 2021-04-09 | End: 2021-11-02

## 2021-03-03 ENCOUNTER — PATIENT MESSAGE (OUTPATIENT)
Dept: PSYCHIATRY | Facility: CLINIC | Age: 26
End: 2021-03-03

## 2021-05-12 ENCOUNTER — PATIENT MESSAGE (OUTPATIENT)
Dept: RESEARCH | Facility: HOSPITAL | Age: 26
End: 2021-05-12

## 2021-06-09 ENCOUNTER — PATIENT MESSAGE (OUTPATIENT)
Dept: PSYCHIATRY | Facility: CLINIC | Age: 26
End: 2021-06-09

## 2021-06-09 RX ORDER — DEXTROAMPHETAMINE SACCHARATE, AMPHETAMINE ASPARTATE MONOHYDRATE, DEXTROAMPHETAMINE SULFATE AND AMPHETAMINE SULFATE 2.5; 2.5; 2.5; 2.5 MG/1; MG/1; MG/1; MG/1
10 CAPSULE, EXTENDED RELEASE ORAL EVERY MORNING
Qty: 30 CAPSULE | Refills: 0 | Status: SHIPPED | OUTPATIENT
Start: 2021-06-09 | End: 2021-09-08 | Stop reason: SDUPTHER

## 2021-09-08 DIAGNOSIS — F90.0 ATTENTION DEFICIT HYPERACTIVITY DISORDER (ADHD), PREDOMINANTLY INATTENTIVE TYPE: Primary | ICD-10-CM

## 2021-09-08 RX ORDER — DEXTROAMPHETAMINE SACCHARATE, AMPHETAMINE ASPARTATE MONOHYDRATE, DEXTROAMPHETAMINE SULFATE AND AMPHETAMINE SULFATE 2.5; 2.5; 2.5; 2.5 MG/1; MG/1; MG/1; MG/1
10 CAPSULE, EXTENDED RELEASE ORAL EVERY MORNING
Qty: 30 CAPSULE | Refills: 0 | Status: SHIPPED | OUTPATIENT
Start: 2021-09-08 | End: 2021-11-02

## 2021-10-29 ENCOUNTER — HISTORICAL (OUTPATIENT)
Dept: ADMINISTRATIVE | Facility: HOSPITAL | Age: 26
End: 2021-10-29

## 2021-10-29 LAB
ABS NEUT (OLG): 4.8 X10(3)/MCL (ref 2.1–9.2)
ALBUMIN SERPL-MCNC: 4.5 GM/DL (ref 3.4–5)
ALBUMIN/GLOB SERPL: 1.61 {RATIO} (ref 1.5–2.5)
ALP SERPL-CCNC: 54 UNIT/L (ref 38–126)
ALT SERPL-CCNC: 18 UNIT/L (ref 7–52)
APPEARANCE, UA: CLEAR
AST SERPL-CCNC: 27 UNIT/L (ref 15–37)
BACTERIA #/AREA URNS AUTO: NORMAL /HPF
BILIRUB SERPL-MCNC: 0.3 MG/DL (ref 0.2–1)
BILIRUB UR QL STRIP: NEGATIVE MG/DL
BILIRUBIN DIRECT+TOT PNL SERPL-MCNC: 0.1 MG/DL (ref 0–0.5)
BILIRUBIN DIRECT+TOT PNL SERPL-MCNC: 0.2 MG/DL
BUN SERPL-MCNC: 12 MG/DL (ref 7–18)
CALCIUM SERPL-MCNC: 9.3 MG/DL (ref 8.5–10)
CHLORIDE SERPL-SCNC: 102 MMOL/L (ref 98–107)
CHOLEST SERPL-MCNC: 253 MG/DL (ref 0–200)
CHOLEST/HDLC SERPL: 4.7 {RATIO}
CO2 SERPL-SCNC: 26 MMOL/L (ref 21–32)
COLOR UR: YELLOW
CREAT SERPL-MCNC: 0.56 MG/DL (ref 0.6–1.3)
ERYTHROCYTE [DISTWIDTH] IN BLOOD BY AUTOMATED COUNT: 12.4 % (ref 11.5–17)
EST. AVERAGE GLUCOSE BLD GHB EST-MCNC: 100 MG/DL
GLOBULIN SER-MCNC: 2.8 GM/DL (ref 1.2–3)
GLUCOSE (UA): NEGATIVE MG/DL
GLUCOSE SERPL-MCNC: 102 MG/DL (ref 74–106)
HBA1C MFR BLD: 5.1 % (ref 4.4–6.4)
HCT VFR BLD AUTO: 43.1 % (ref 37–47)
HDLC SERPL-MCNC: 54 MG/DL (ref 35–60)
HGB BLD-MCNC: 14.2 GM/DL (ref 12–16)
HGB UR QL STRIP: NEGATIVE UNIT/L
KETONES UR QL STRIP: NEGATIVE MG/DL
LDLC SERPL CALC-MCNC: 172 MG/DL (ref 0–129)
LEUKOCYTE ESTERASE UR QL STRIP: NEGATIVE UNIT/L
LYMPHOCYTES # BLD AUTO: 2.8 X10(3)/MCL (ref 0.6–3.4)
LYMPHOCYTES NFR BLD AUTO: 34.1 % (ref 13–40)
MCH RBC QN AUTO: 30.1 PG (ref 27–31.2)
MCHC RBC AUTO-ENTMCNC: 33 GM/DL (ref 32–36)
MCV RBC AUTO: 92 FL (ref 80–94)
MONOCYTES # BLD AUTO: 0.6 X10(3)/MCL (ref 0.1–1.3)
MONOCYTES NFR BLD AUTO: 6.9 % (ref 0.1–24)
NEUTROPHILS NFR BLD AUTO: 59 % (ref 47–80)
NITRITE UR QL STRIP.AUTO: NEGATIVE
PH UR STRIP: 7.5 [PH]
PLATELET # BLD AUTO: 231 X10(3)/MCL (ref 130–400)
PMV BLD AUTO: 10 FL (ref 9.4–12.4)
POTASSIUM SERPL-SCNC: 4.4 MMOL/L (ref 3.5–5.1)
PROT SERPL-MCNC: 7.3 GM/DL (ref 6.4–8.2)
PROT UR QL STRIP: NEGATIVE MG/DL
RBC # BLD AUTO: 4.71 X10(6)/MCL (ref 4.2–5.4)
RBC #/AREA URNS HPF: NORMAL /HPF
SODIUM SERPL-SCNC: 138 MMOL/L (ref 136–145)
SP GR UR STRIP: 1.01
SQUAMOUS EPITHELIAL, UA: NORMAL /LPF
T4 FREE SERPL-MCNC: 1.15 NG/DL (ref 0.76–1.46)
TRIGL SERPL-MCNC: 51 MG/DL (ref 30–150)
TSH SERPL-ACNC: 0.4 MIU/ML (ref 0.35–4.94)
UROBILINOGEN UR STRIP-ACNC: 0.2 MG/DL
VLDLC SERPL CALC-MCNC: 10.2 MG/DL
WBC # SPEC AUTO: 8.2 X10(3)/MCL (ref 4.5–11.5)
WBC #/AREA URNS AUTO: NORMAL /[HPF]

## 2021-11-01 LAB — EST CREAT CLEARANCE SER (OHS): 127.65 ML/MIN

## 2021-11-02 ENCOUNTER — OFFICE VISIT (OUTPATIENT)
Dept: PSYCHIATRY | Facility: CLINIC | Age: 26
End: 2021-11-02
Payer: COMMERCIAL

## 2021-11-02 VITALS — SYSTOLIC BLOOD PRESSURE: 123 MMHG | HEART RATE: 77 BPM | WEIGHT: 116.19 LBS | DIASTOLIC BLOOD PRESSURE: 85 MMHG

## 2021-11-02 DIAGNOSIS — F41.0 PANIC ATTACKS: ICD-10-CM

## 2021-11-02 DIAGNOSIS — F41.9 ANXIETY DISORDER, UNSPECIFIED TYPE: Primary | ICD-10-CM

## 2021-11-02 DIAGNOSIS — F90.0 ATTENTION DEFICIT HYPERACTIVITY DISORDER (ADHD), PREDOMINANTLY INATTENTIVE TYPE: ICD-10-CM

## 2021-11-02 PROCEDURE — 99215 PR OFFICE/OUTPT VISIT, EST, LEVL V, 40-54 MIN: ICD-10-PCS | Mod: S$GLB,,, | Performed by: PSYCHIATRY & NEUROLOGY

## 2021-11-02 PROCEDURE — 99999 PR PBB SHADOW E&M-EST. PATIENT-LVL II: CPT | Mod: PBBFAC,,, | Performed by: PSYCHIATRY & NEUROLOGY

## 2021-11-02 PROCEDURE — 3074F PR MOST RECENT SYSTOLIC BLOOD PRESSURE < 130 MM HG: ICD-10-PCS | Mod: CPTII,S$GLB,, | Performed by: PSYCHIATRY & NEUROLOGY

## 2021-11-02 PROCEDURE — 99999 PR PBB SHADOW E&M-EST. PATIENT-LVL II: ICD-10-PCS | Mod: PBBFAC,,, | Performed by: PSYCHIATRY & NEUROLOGY

## 2021-11-02 PROCEDURE — 3079F DIAST BP 80-89 MM HG: CPT | Mod: CPTII,S$GLB,, | Performed by: PSYCHIATRY & NEUROLOGY

## 2021-11-02 PROCEDURE — 3079F PR MOST RECENT DIASTOLIC BLOOD PRESSURE 80-89 MM HG: ICD-10-PCS | Mod: CPTII,S$GLB,, | Performed by: PSYCHIATRY & NEUROLOGY

## 2021-11-02 PROCEDURE — 3074F SYST BP LT 130 MM HG: CPT | Mod: CPTII,S$GLB,, | Performed by: PSYCHIATRY & NEUROLOGY

## 2021-11-02 PROCEDURE — 99215 OFFICE O/P EST HI 40 MIN: CPT | Mod: S$GLB,,, | Performed by: PSYCHIATRY & NEUROLOGY

## 2021-11-02 RX ORDER — CLONIDINE HYDROCHLORIDE 0.1 MG/1
0.1 TABLET ORAL DAILY PRN
Qty: 30 TABLET | Refills: 0 | Status: SHIPPED | OUTPATIENT
Start: 2021-11-02 | End: 2022-01-25

## 2021-11-02 RX ORDER — DEXTROAMPHETAMINE SACCHARATE, AMPHETAMINE ASPARTATE MONOHYDRATE, DEXTROAMPHETAMINE SULFATE AND AMPHETAMINE SULFATE 2.5; 2.5; 2.5; 2.5 MG/1; MG/1; MG/1; MG/1
10 CAPSULE, EXTENDED RELEASE ORAL DAILY
Qty: 30 CAPSULE | Refills: 0 | Status: SHIPPED | OUTPATIENT
Start: 2021-12-02 | End: 2022-01-01

## 2021-11-02 RX ORDER — ESCITALOPRAM OXALATE 20 MG/1
20 TABLET ORAL DAILY
Qty: 90 TABLET | Refills: 0 | Status: SHIPPED | OUTPATIENT
Start: 2021-11-02 | End: 2022-01-25 | Stop reason: SDUPTHER

## 2021-11-02 RX ORDER — DEXTROAMPHETAMINE SACCHARATE, AMPHETAMINE ASPARTATE MONOHYDRATE, DEXTROAMPHETAMINE SULFATE AND AMPHETAMINE SULFATE 2.5; 2.5; 2.5; 2.5 MG/1; MG/1; MG/1; MG/1
10 CAPSULE, EXTENDED RELEASE ORAL DAILY
Qty: 30 CAPSULE | Refills: 0 | Status: SHIPPED | OUTPATIENT
Start: 2021-11-02 | End: 2021-12-02

## 2021-11-02 RX ORDER — DEXTROAMPHETAMINE SACCHARATE, AMPHETAMINE ASPARTATE MONOHYDRATE, DEXTROAMPHETAMINE SULFATE AND AMPHETAMINE SULFATE 2.5; 2.5; 2.5; 2.5 MG/1; MG/1; MG/1; MG/1
10 CAPSULE, EXTENDED RELEASE ORAL DAILY
Qty: 30 CAPSULE | Refills: 0 | Status: SHIPPED | OUTPATIENT
Start: 2021-12-31 | End: 2022-01-25 | Stop reason: SDUPTHER

## 2021-12-06 ENCOUNTER — PATIENT MESSAGE (OUTPATIENT)
Dept: PSYCHIATRY | Facility: CLINIC | Age: 26
End: 2021-12-06
Payer: COMMERCIAL

## 2021-12-06 DIAGNOSIS — F90.0 ATTENTION DEFICIT HYPERACTIVITY DISORDER (ADHD), PREDOMINANTLY INATTENTIVE TYPE: ICD-10-CM

## 2021-12-06 RX ORDER — DEXTROAMPHETAMINE SACCHARATE, AMPHETAMINE ASPARTATE MONOHYDRATE, DEXTROAMPHETAMINE SULFATE AND AMPHETAMINE SULFATE 2.5; 2.5; 2.5; 2.5 MG/1; MG/1; MG/1; MG/1
10 CAPSULE, EXTENDED RELEASE ORAL DAILY
Qty: 30 CAPSULE | Refills: 0 | Status: CANCELLED | OUTPATIENT
Start: 2021-12-06 | End: 2022-01-05

## 2022-01-19 ENCOUNTER — PATIENT MESSAGE (OUTPATIENT)
Dept: PSYCHIATRY | Facility: CLINIC | Age: 27
End: 2022-01-19
Payer: COMMERCIAL

## 2022-01-20 NOTE — PROGRESS NOTES
Was preparing to send Adderall XR 10 mg as requested tho after much due diligence and clinic Silvina Moreno informs me that there is 1 Rx STILL available to her thus / I did not order anything else at this time.    D Post MD

## 2022-01-25 ENCOUNTER — OFFICE VISIT (OUTPATIENT)
Dept: PSYCHIATRY | Facility: CLINIC | Age: 27
End: 2022-01-25
Payer: COMMERCIAL

## 2022-01-25 DIAGNOSIS — F90.0 ATTENTION DEFICIT HYPERACTIVITY DISORDER (ADHD), PREDOMINANTLY INATTENTIVE TYPE: ICD-10-CM

## 2022-01-25 DIAGNOSIS — F43.21 GRIEF: ICD-10-CM

## 2022-01-25 DIAGNOSIS — F41.9 ANXIETY DISORDER, UNSPECIFIED TYPE: Primary | ICD-10-CM

## 2022-01-25 DIAGNOSIS — F41.0 PANIC ATTACKS: ICD-10-CM

## 2022-01-25 PROCEDURE — 99214 PR OFFICE/OUTPT VISIT, EST, LEVL IV, 30-39 MIN: ICD-10-PCS | Mod: 95,,, | Performed by: PSYCHIATRY & NEUROLOGY

## 2022-01-25 PROCEDURE — 99214 OFFICE O/P EST MOD 30 MIN: CPT | Mod: 95,,, | Performed by: PSYCHIATRY & NEUROLOGY

## 2022-01-25 RX ORDER — DEXTROAMPHETAMINE SACCHARATE, AMPHETAMINE ASPARTATE MONOHYDRATE, DEXTROAMPHETAMINE SULFATE AND AMPHETAMINE SULFATE 2.5; 2.5; 2.5; 2.5 MG/1; MG/1; MG/1; MG/1
10 CAPSULE, EXTENDED RELEASE ORAL DAILY
Qty: 30 CAPSULE | Refills: 0 | Status: SHIPPED | OUTPATIENT
Start: 2022-02-24 | End: 2022-03-08 | Stop reason: SDUPTHER

## 2022-01-25 RX ORDER — DEXTROAMPHETAMINE SACCHARATE, AMPHETAMINE ASPARTATE MONOHYDRATE, DEXTROAMPHETAMINE SULFATE AND AMPHETAMINE SULFATE 2.5; 2.5; 2.5; 2.5 MG/1; MG/1; MG/1; MG/1
10 CAPSULE, EXTENDED RELEASE ORAL DAILY
Qty: 30 CAPSULE | Refills: 0 | Status: SHIPPED | OUTPATIENT
Start: 2022-03-26 | End: 2022-03-11

## 2022-01-25 RX ORDER — ESCITALOPRAM OXALATE 20 MG/1
20 TABLET ORAL DAILY
Qty: 90 TABLET | Refills: 0 | Status: SHIPPED | OUTPATIENT
Start: 2022-01-25 | End: 2022-04-05 | Stop reason: SDUPTHER

## 2022-01-25 NOTE — PROGRESS NOTES
"    The patient location is: Location:  Her apt  [facility]     Visit type: AUDIO ONLY (session held despite  EPIC schedule showing "left before seen"; clinician was running late related to some admin check inb issues of earleir pts)  Carlos duval  did call pt inform pt that he was running behind . She was understanding and we did attempt telehealth a bit after 5 pm  Tho pt could not log on; as such mutually agreed to AUDIO SESSION    Each patient to whom  medical services are provided by telemedicine is: (1) informed of the relationship between the physician and patient and the respective role of any other health care provider with respect to management of the patient; and (2) notified that he or she may decline to receive medical services by telemedicine and may withdraw from such care at any time.    I also informed patient of the following:   Elder Whitley MD:  [id number]    GENEVIEVE Whitley MD is employed by:  [employer]  [place], [place]      If technology issues arise during call,  pt informed MD will attempt to call pt back or pt may call office phone: [phone]    Pt informed that IF acute concerns to: Dial 911 or go to nearest Emergency Room (ER)    If pt has questions related to privacy practices or records: pt instructed to contact [facility] Information Department: [phone]    Alexa Araujo   1995   01/25/2022      S: Patient's Own Perception of Condition (& Side Effects) : no med complaint      O:      CURRENT PRESENTATION:      Alert calm     polite goal directed    No SI  No HI    Upbeat / 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    HENT: Negative.    Eyes: Negative.    Respiratory: Negative.    Cardiovascular: Negative.    Gastrointestinal: Negative.    Genitourinary: Negative.    Musculoskeletal: Negative.    Skin: Negative.    Neurological: Negative.    Endo/Heme/Allergies: Negative.          There were no vitals filed for this visit.        Laboratory Data  No visits with results within 1 Month(s) from this visit.   Latest known visit with results is:   Lab Visit on 01/02/2019   Component Date Value Ref Range Status    WBC 01/02/2019 6.43  3.90 - 12.70 K/uL Final    RBC 01/02/2019 4.59  4.00 - 5.40 M/uL Final    Hemoglobin 01/02/2019 13.6  12.0 - 16.0 g/dL Final    Hematocrit 01/02/2019 42.8  37.0 - 48.5 % Final    MCV 01/02/2019 93  82 - 98 fL Final    MCH 01/02/2019 29.6  27.0 - 31.0 pg Final    MCHC 01/02/2019 31.8* 32.0 - 36.0 g/dL Final    RDW 01/02/2019 12.0  11.5 - 14.5 % Final    Platelets 01/02/2019 201  150 - 350 K/uL Final    MPV 01/02/2019 11.4  9.2 - 12.9 fL Final    Immature Granulocytes 01/02/2019 0.3  0.0 - 0.5 % Final    Gran # (ANC) 01/02/2019 3.6  1.8 - 7.7 K/uL Final    Immature Grans (Abs) 01/02/2019 0.02  0.00 - 0.04 K/uL Final    Lymph # 01/02/2019 2.3  1.0 - 4.8 K/uL Final    Mono # 01/02/2019 0.4  0.3 - 1.0 K/uL Final    Eos # 01/02/2019 0.1  0.0 - 0.5 K/uL Final    Baso # 01/02/2019 0.04  0.00 - 0.20 K/uL Final    nRBC 01/02/2019 0  0 /100 WBC Final    Gran % 01/02/2019 55.3  38.0 - 73.0 % Final    Lymph % 01/02/2019 36.1  18.0 - 48.0 % Final    Mono % 01/02/2019 6.5  4.0 - 15.0 % Final    Eosinophil % 01/02/2019 1.2  0.0 - 8.0 % Final    Basophil % 01/02/2019 0.6  0.0 - 1.9 % Final    Differential Method 01/02/2019 Automated   Final    Sodium 01/02/2019 138  136 - 145 mmol/L Final    Potassium 01/02/2019 4.6  3.5 - 5.1 mmol/L Final    Chloride 01/02/2019 106  95 - 110 mmol/L Final    CO2 01/02/2019 22* 23 - 29 mmol/L Final    Glucose 01/02/2019 88  70 -  110 mg/dL Final    BUN 01/02/2019 7  6 - 20 mg/dL Final    Creatinine 01/02/2019 0.7  0.5 - 1.4 mg/dL Final    Calcium 01/02/2019 9.5  8.7 - 10.5 mg/dL Final    Total Protein 01/02/2019 7.1  6.0 - 8.4 g/dL Final    Albumin 01/02/2019 3.4* 3.5 - 5.2 g/dL Final    Total Bilirubin 01/02/2019 0.3  0.1 - 1.0 mg/dL Final    Alkaline Phosphatase 01/02/2019 55  55 - 135 U/L Final    AST 01/02/2019 14  10 - 40 U/L Final    ALT 01/02/2019 14  10 - 44 U/L Final    Anion Gap 01/02/2019 10  8 - 16 mmol/L Final    eGFR if African American 01/02/2019 >60.0  >60 mL/min/1.73 m^2 Final    eGFR if non African American 01/02/2019 >60.0  >60 mL/min/1.73 m^2 Final    Vitamin B-12 01/02/2019 523  210 - 950 pg/mL Final    TSH 01/02/2019 0.551  0.400 - 4.000 uIU/mL Final        Mental Status Exam:      Appearance: casual   Oriented: x 3   Attitude: cooperative   Eye Contact: good  Behavior: calm     Mood: says feeling better  Cognition: alert  Concentration: grossly intact   Affect: appropriate range      Anxiety: mild     Thought Process: goal directed     Speech:       Volume : WNL       Quantity WNL       Quality: appears to openly answer questions      Threats: no SI / no HI     Psychosis: denies all      Estimate of Intellectual Function: average   Impulse Control: no thoughts of harm to self/ others      Musculoskeletal: no report of tremor      Social: engaged / graduating [employer] / has supportive mother and friends       Patient Active Problem List   Diagnosis    Generalized anxiety disorder with panic attacks    ADHD    Grief    Anxiety disorder    Panic attacks        Current Outpatient Medications:     [START ON 2/24/2022] dextroamphetamine-amphetamine (ADDERALL XR) 10 MG 24 hr capsule, Take 1 capsule (10 mg total) by mouth once daily., Disp: 30 capsule, Rfl: 0    [START ON 3/26/2022] dextroamphetamine-amphetamine (ADDERALL XR) 10 MG 24 hr capsule, Take 1 capsule (10 mg total) by mouth once daily.,  Disp: 30 capsule, Rfl: 0    EScitalopram oxalate (LEXAPRO) 20 MG tablet, Take 1 tablet (20 mg total) by mouth once daily., Disp: 90 tablet, Rfl: 0    ESTARYLLA 0.25-35 mg-mcg per tablet, Take 1 tablet by mouth once daily., Disp: , Rfl: 10     Social History     Tobacco Use   Smoking Status Unknown If Ever Smoked   Smokeless Tobacco Not on file        Review of patient's allergies indicates:  No Known Allergies     ASSESSMENT:   Encounter Diagnoses   Name Primary?    Anxiety disorder, unspecified type; some generalized features Yes    Attention deficit hyperactivity disorder (ADHD), predominantly inattentive type     Grief: Father passed away Feb 18 2019 of cirrhosis     Panic attacks        >> BACKGROUND << (from 11-2-2021 Psyc Eval):      Referred by: (Whose idea to come see Psychiatry) :  Admin transfer as Bozena Machuca MD left [facility]    CHIEF COMPLAINT :  Continuity of care / has been taking Adderall XR 10 mg and Lexapro 20 mg ; desires reflls of same    HISTORY:         Alexa Araujo a 26 y.o.  female presents today as referred by Bozena Machuca MD    Panic attack reports monthly x 10 min     Father passed away in February 18 2019 from stroke; dad had history cirrhosis. Pt had subsequently Has been struggling with problems with motivation, and getting up and going usually when her dad's death.    She has not opted for counselor tho such offerred yet she politely declines aware can re request.    Ms Araujo reports that she doing ok on med regimen; likes such.     Excerpt of  Bozena Machuca MD initial Psyc Eval of from: 1-2-2019 :    24 yo having problems with execution, she can't follow through.  Patient feels that she has anxiety and so overwhelmed that she can't stay on the task. Patient has a history of anxiety attacks, is on Celexa.     Patient has had problems with sleep, recently switched Celexa to the morning and sleeping better, since the switch. Unsure if there has been  "much benefit on the medication. 6 months to a year started having this problem. Father was diagnosed with cirrhosis in September 28 of this year (2018). Has a boyfriend for 2 1/2 years has a hard time balancing this with school. Started having difficulties in school after third semester because she could not balance work and school. Finds that she has a difficult time with motivation. Doesn't want to do anything, she just sleeps all day and watches television.     Mom said that as a child in school she burned a lot of excess energy in sports, mom remembers that she had to keep her on a tight schedule. Patient had conduct issues as a child she was restlessness and talk out of turn. Her school work was done really quickly, and a lot of mistakes when she was little.     Currently is a civil engineering major. Plan is to get a job after school.     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      Panic Attacks:     Frequency:monthly     No specific stressor      palpitations, pounding heart, or accelerated heart rate ?  y  sweating ? n  trembling or shaking ? y  sensations of shortness of breath or smothering ? y  feeling of choking ? n  chest pain or discomfort ? n  nausea or abdominal distress ?n  feeling dizzy, unsteady, lightheaded, or faint ? n  derealization (feelings of unreality) or depersonalization (being detached from oneself) ? n  fear of losing control ? n  fear of dying ? n  paresthesias (numbness or tingling sensations) ? n  chills or hot flushes ? y hot     Agoraphobia ? mild     Current Issues:    Says she soon to finish [employer] degree; grew up [employer]     Self Rating Scales: (Such submitted for scanning) :      [employer] Framework: a "bio-psycho-social" screening " form for use as clinical raw data. / (submitted for scanning)      ysabel Yip MD     Physical Abuse: N      Sexual abuse  N     Psychosis: n     Substance Use:     Alcohol: social rare      Cannabis :n     Tobacco: n     Cocaine: n  Benzo:n  Opioid: n  Ecstasy:n  Other:n     PAST PSYCHIATRIC HISTORY:      Inpatient: n   Outpatient: (incl. Primary Care): Bozena Machuca MD        Allergy Review:   Review of patient's allergies indicates:  No Known Allergies          I personally performed the service described in the documentation recorded by the scribe in my presence, and it accurately and completely records my words and actions.

## 2022-01-26 NOTE — PATIENT INSTRUCTIONS
"  PLAN:     FOLLOW UP     April 5 2022 aim 4:30 TELEHEALTH YET PATIENT  NEEDS TO CONFIRM APPOINTMENT  by seeing such appointment  appear on their "My Chart" roberto carlos.  NOTE:  IF  appointment is not visible, THEN patient is  instructed to call  [phone] and coordinate appointment scheduling with . Understanding Expressed.    Meds: see after visit summary renewed Adderall XR 10 mg and Lexapro 20 mg ; removed  (D/C) clonidine from profile not using    Note D Post MD called her pharmacy and they confirm receipt of : having 1 Adderall XR 10 mg  on hand from prior and 2 NEW ERx for adderall XR and did receive new Lexapro  20 mg Rx     References:     Relaxation stress reduction workbook: CALLUM Heath PhD ( used: $7-10)    Feeling Good Website: Elder Norris MD / [email or web address] website (free) / jayne. PODCASTS    Anxiety &  phobia workbook by MATTHEW Solo PhD  (web retailers: used: $ 7-10)    [place]: Path to Better Sleep : https://www.veterantraining.[place].gov/insomnia/ (free)     Pt expressed appreciation for the visit today and did not have further question at this time though pt  was still informed to:     Call  if problems.    Call / Report Side Effects to Psyc MD     Encouraged to follow up with primary care / Gen Med MD for continued monitoring of general health and wellness.    Understanding was expressed; and no further concerns nor questions were raised at this time.     remember healthy self care:   eat right  attempt adequate rest   HANDWASHING / encourage such jayne. During this corona virus time   walk or light exercise within reason and as your general med team approves  read or explore any of reference materials / homework mentioned  reach out (I.e.,  connect with)  others who nuture and bring out best in you  avoid risky behaviors  keep your appointments  IF you  cannot make your appt THEN please call or go online to reschedule.  avoid  alcohol and illicit substances.  Look for the positive.  All is " often relative-seek balance  Call sooner if needed : [phone]   Call 911 or go to Emergency Room  (ER)  if any acute concerns

## 2022-03-08 ENCOUNTER — PATIENT MESSAGE (OUTPATIENT)
Dept: PSYCHIATRY | Facility: CLINIC | Age: 27
End: 2022-03-08
Payer: COMMERCIAL

## 2022-03-08 DIAGNOSIS — F90.0 ATTENTION DEFICIT HYPERACTIVITY DISORDER (ADHD), PREDOMINANTLY INATTENTIVE TYPE: ICD-10-CM

## 2022-03-11 RX ORDER — DEXTROAMPHETAMINE SACCHARATE, AMPHETAMINE ASPARTATE MONOHYDRATE, DEXTROAMPHETAMINE SULFATE AND AMPHETAMINE SULFATE 2.5; 2.5; 2.5; 2.5 MG/1; MG/1; MG/1; MG/1
10 CAPSULE, EXTENDED RELEASE ORAL DAILY
Qty: 30 CAPSULE | Refills: 0 | Status: SHIPPED | OUTPATIENT
Start: 2022-03-11 | End: 2022-04-05 | Stop reason: SDUPTHER

## 2022-04-01 ENCOUNTER — TELEPHONE (OUTPATIENT)
Dept: PSYCHIATRY | Facility: CLINIC | Age: 27
End: 2022-04-01
Payer: COMMERCIAL

## 2022-04-05 ENCOUNTER — OFFICE VISIT (OUTPATIENT)
Dept: PSYCHIATRY | Facility: CLINIC | Age: 27
End: 2022-04-05
Payer: COMMERCIAL

## 2022-04-05 DIAGNOSIS — F41.9 ANXIETY DISORDER, UNSPECIFIED TYPE: Primary | ICD-10-CM

## 2022-04-05 DIAGNOSIS — F90.0 ATTENTION DEFICIT HYPERACTIVITY DISORDER (ADHD), PREDOMINANTLY INATTENTIVE TYPE: ICD-10-CM

## 2022-04-05 DIAGNOSIS — F43.21 GRIEF: ICD-10-CM

## 2022-04-05 PROCEDURE — 99214 OFFICE O/P EST MOD 30 MIN: CPT | Mod: 95,,, | Performed by: PSYCHIATRY & NEUROLOGY

## 2022-04-05 PROCEDURE — 99214 PR OFFICE/OUTPT VISIT, EST, LEVL IV, 30-39 MIN: ICD-10-PCS | Mod: 95,,, | Performed by: PSYCHIATRY & NEUROLOGY

## 2022-04-05 RX ORDER — DEXTROAMPHETAMINE SACCHARATE, AMPHETAMINE ASPARTATE MONOHYDRATE, DEXTROAMPHETAMINE SULFATE AND AMPHETAMINE SULFATE 2.5; 2.5; 2.5; 2.5 MG/1; MG/1; MG/1; MG/1
10 CAPSULE, EXTENDED RELEASE ORAL DAILY
Qty: 30 CAPSULE | Refills: 0 | Status: SHIPPED | OUTPATIENT
Start: 2022-06-20 | End: 2022-06-13

## 2022-04-05 RX ORDER — DEXTROAMPHETAMINE SACCHARATE, AMPHETAMINE ASPARTATE MONOHYDRATE, DEXTROAMPHETAMINE SULFATE AND AMPHETAMINE SULFATE 2.5; 2.5; 2.5; 2.5 MG/1; MG/1; MG/1; MG/1
10 CAPSULE, EXTENDED RELEASE ORAL DAILY
Qty: 30 CAPSULE | Refills: 0 | Status: SHIPPED | OUTPATIENT
Start: 2022-04-22 | End: 2022-05-22

## 2022-04-05 RX ORDER — ESCITALOPRAM OXALATE 20 MG/1
20 TABLET ORAL DAILY
Qty: 90 TABLET | Refills: 0 | Status: SHIPPED | OUTPATIENT
Start: 2022-04-05 | End: 2022-06-13 | Stop reason: SDUPTHER

## 2022-04-05 RX ORDER — DEXTROAMPHETAMINE SACCHARATE, AMPHETAMINE ASPARTATE MONOHYDRATE, DEXTROAMPHETAMINE SULFATE AND AMPHETAMINE SULFATE 2.5; 2.5; 2.5; 2.5 MG/1; MG/1; MG/1; MG/1
10 CAPSULE, EXTENDED RELEASE ORAL DAILY
Qty: 30 CAPSULE | Refills: 0 | Status: SHIPPED | OUTPATIENT
Start: 2022-05-21 | End: 2022-06-13 | Stop reason: SDUPTHER

## 2022-04-05 NOTE — PATIENT INSTRUCTIONS
"  PLAN:     FOLLOW UP     aim  June 13 2022 4 pm TELEHEALTH    PATIENT  NEEDS TO CONFIRM APPOINTMENT  by seeing such appointment  appear on their "My Chart" roberto carlos.  NOTE:  IF  appointment is not visible, THEN patient is  instructed to call  [phone] and coordinate appointment scheduling with . Understanding Expressed.    Meds: see after visit summary renewed Adderall XR 10 mg and Lexapro 20 mg    References:     Relaxation stress reduction workbook: CALLUM Heath PhD ( used: $7-10)    Feeling Good Website: Elder Norris MD / [email or web address] website (free) / jayne. PODCASTS    Anxiety &  phobia workbook by MATTHEW Solo PhD  (web retailers: used: $ 7-10)    [place]: Path to Better Sleep : https://www.veterantraining.[place].gov/insomnia/ (free)     Pt expressed appreciation for the visit today and did not have further question at this time though pt  was still informed to:     Call  if problems.    Call / Report Side Effects to Psyc MD     Encouraged to follow up with primary care / Gen Med MD for continued monitoring of general health and wellness.    Understanding was expressed; and no further concerns nor questions were raised at this time.     remember healthy self care:   eat right  attempt adequate rest   HANDWASHING / encourage such jayne. During this corona virus time   walk or light exercise within reason and as your general med team approves  read or explore any of reference materials / homework mentioned  reach out (I.e.,  connect with)  others who nuture and bring out best in you  avoid risky behaviors  keep your appointments  IF you  cannot make your appt THEN please call or go online to reschedule.  avoid  alcohol and illicit substances.  Look for the positive.  All is often relative-seek balance  Call sooner if needed : [phone]   Call 911 or go to Emergency Room  (ER)  if any acute concerns    "

## 2022-04-05 NOTE — PROGRESS NOTES
Disclaimer: Evaluation and treatment is based on information presented to date. Any new information may affect assessment and findings.     Each patient to whom  medical services are provided by telemedicine is: (1) informed of the relationship between the physician and patient and the respective role of any other health care provider with respect to management of the patient; and (2) notified that he or she may decline to receive medical services by telemedicine and may withdraw from such care at any time.    I also informed patient of the following:   Elder Whitley MD:  [id number]    D Angi HOWE is employed by:  [facility] [place], [place]      If technology issues arise during call,  pt informed MD will attempt to call pt back or pt may call office phone: [phone]    Pt informed that IF acute concerns to: Dial 911 or go to nearest Emergency Room (ER)    If pt has questions related to privacy practices or records: pt instructed to contact [facility] Information Department: [phone]     Location:  Her apt  [facility]     Alexa Araujo   1995   04/05/2022      S: Patient's Own Perception of Condition (& Side Effects) : no med complaint      O:      CURRENT PRESENTATION:     Upbeat / looking for booker to graduating this semester [employer]    July 2022:  Considering to move to [place] (summer) where [facility] living     Smiling cheerful     alert calm     polite     goal directed    No SI  No HI    3rd yr anniversary of her father passing away  Feb 18 2019 Due to CIRRHOSIS ; she was one who mentioned ; handing ok; not tearful says she has supports:  mariah family friends     Comments Medication: likes her medication     Constitutional Health Concerns:       Laboratory Data  No visits with results within 1 Month(s) from this visit.   Latest known visit with results is:   Lab Visit on 01/02/2019   Component Date Value Ref Range Status     WBC 01/02/2019 6.43  3.90 - 12.70 K/uL Final    RBC 01/02/2019 4.59  4.00 - 5.40 M/uL Final    Hemoglobin 01/02/2019 13.6  12.0 - 16.0 g/dL Final    Hematocrit 01/02/2019 42.8  37.0 - 48.5 % Final    MCV 01/02/2019 93  82 - 98 fL Final    MCH 01/02/2019 29.6  27.0 - 31.0 pg Final    MCHC 01/02/2019 31.8 (A) 32.0 - 36.0 g/dL Final    RDW 01/02/2019 12.0  11.5 - 14.5 % Final    Platelets 01/02/2019 201  150 - 350 K/uL Final    MPV 01/02/2019 11.4  9.2 - 12.9 fL Final    Immature Granulocytes 01/02/2019 0.3  0.0 - 0.5 % Final    Gran # (ANC) 01/02/2019 3.6  1.8 - 7.7 K/uL Final    Immature Grans (Abs) 01/02/2019 0.02  0.00 - 0.04 K/uL Final    Lymph # 01/02/2019 2.3  1.0 - 4.8 K/uL Final    Mono # 01/02/2019 0.4  0.3 - 1.0 K/uL Final    Eos # 01/02/2019 0.1  0.0 - 0.5 K/uL Final    Baso # 01/02/2019 0.04  0.00 - 0.20 K/uL Final    nRBC 01/02/2019 0  0 /100 WBC Final    Gran % 01/02/2019 55.3  38.0 - 73.0 % Final    Lymph % 01/02/2019 36.1  18.0 - 48.0 % Final    Mono % 01/02/2019 6.5  4.0 - 15.0 % Final    Eosinophil % 01/02/2019 1.2  0.0 - 8.0 % Final    Basophil % 01/02/2019 0.6  0.0 - 1.9 % Final    Differential Method 01/02/2019 Automated   Final    Sodium 01/02/2019 138  136 - 145 mmol/L Final    Potassium 01/02/2019 4.6  3.5 - 5.1 mmol/L Final    Chloride 01/02/2019 106  95 - 110 mmol/L Final    CO2 01/02/2019 22 (A) 23 - 29 mmol/L Final    Glucose 01/02/2019 88  70 - 110 mg/dL Final    BUN 01/02/2019 7  6 - 20 mg/dL Final    Creatinine 01/02/2019 0.7  0.5 - 1.4 mg/dL Final    Calcium 01/02/2019 9.5  8.7 - 10.5 mg/dL Final    Total Protein 01/02/2019 7.1  6.0 - 8.4 g/dL Final    Albumin 01/02/2019 3.4 (A) 3.5 - 5.2 g/dL Final    Total Bilirubin 01/02/2019 0.3  0.1 - 1.0 mg/dL Final    Alkaline Phosphatase 01/02/2019 55  55 - 135 U/L Final    AST 01/02/2019 14  10 - 40 U/L Final    ALT 01/02/2019 14  10 - 44 U/L Final    Anion Gap 01/02/2019 10  8 - 16 mmol/L Final     eGFR if African American 01/02/2019 >60.0  >60 mL/min/1.73 m^2 Final    eGFR if non African American 01/02/2019 >60.0  >60 mL/min/1.73 m^2 Final    Vitamin B-12 01/02/2019 523  210 - 950 pg/mL Final    TSH 01/02/2019 0.551  0.400 - 4.000 uIU/mL Final        Mental Status Exam:      Appearance: casual   Oriented: x 3   Attitude: cooperative   Eye Contact: good  Behavior: calm     Mood: says feeling better  Cognition: alert  Concentration: grossly intact   Affect: appropriate range      Anxiety: mild     Thought Process: goal directed     Speech:       Volume : WNL       Quantity WNL       Quality: appears to openly answer questions      Threats: no SI / no HI     Psychosis: denies all      Estimate of Intellectual Function: average   Impulse Control: no thoughts of harm to self/ others      Musculoskeletal: no report of tremor      Social: engaged to pre med student  / graduating [employer] / has supportive mother and friends       Patient Active Problem List   Diagnosis    Generalized anxiety disorder with panic attacks    ADHD    Grief    Anxiety disorder    Panic attacks        Current Outpatient Medications:     [START ON 4/22/2022] dextroamphetamine-amphetamine (ADDERALL XR) 10 MG 24 hr capsule, Take 1 capsule (10 mg total) by mouth once daily., Disp: 30 capsule, Rfl: 0    [START ON 5/21/2022] dextroamphetamine-amphetamine (ADDERALL XR) 10 MG 24 hr capsule, Take 1 capsule (10 mg total) by mouth once daily., Disp: 30 capsule, Rfl: 0    [START ON 6/20/2022] dextroamphetamine-amphetamine (ADDERALL XR) 10 MG 24 hr capsule, Take 1 capsule (10 mg total) by mouth once daily., Disp: 30 capsule, Rfl: 0    EScitalopram oxalate (LEXAPRO) 20 MG tablet, Take 1 tablet (20 mg total) by mouth once daily., Disp: 90 tablet, Rfl: 0    ESTARYLLA 0.25-35 mg-mcg per tablet, Take 1 tablet by mouth once daily., Disp: , Rfl: 10     Social History     Tobacco Use   Smoking Status Unknown If Ever Smoked   Smokeless  "Tobacco Not on file        Review of patient's allergies indicates:  No Known Allergies     ASSESSMENT:   Encounter Diagnoses   Name Primary?    Anxiety disorder, unspecified type; some generalized features     Attention deficit hyperactivity disorder (ADHD), predominantly inattentive type      Patient Instructions     PLAN:     FOLLOW UP     aim  June 13 2022 4 pm TELEHEALTH    PATIENT  NEEDS TO CONFIRM APPOINTMENT  by seeing such appointment  appear on their "My Chart" roberto carlos.  NOTE:  IF  appointment is not visible, THEN patient is  instructed to call  [phone] and coordinate appointment scheduling with . Understanding Expressed.    Meds: see after visit summary renewed Adderall XR 10 mg and Lexapro 20 mg    References:     Relaxation stress reduction workbook: CALLUM Heath PhD ( used: $7-10)    Feeling Good Website: Elder Norris MD / [email or web address] website (free) / jayne. PODCASTS    Anxiety &  phobia workbook by MATTHEW Solo PhD  (web retailers: used: $ 7-10)    [place]: Path to Better Sleep : https://www.veterantraining.[place]/insomnia/ (free)     Pt expressed appreciation for the visit today and did not have further question at this time though pt  was still informed to:     Call  if problems.    Call / Report Side Effects to Psyc MD     Encouraged to follow up with primary care / Gen Med MD for continued monitoring of general health and wellness.    Understanding was expressed; and no further concerns nor questions were raised at this time.     remember healthy self care:   eat right  attempt adequate rest   HANDWASHING / encourage such jayne. During this corona virus time   walk or light exercise within reason and as your general med team approves  read or explore any of reference materials / homework mentioned  reach out (I.e.,  connect with)  others who nuture and bring out best in you  avoid risky behaviors  keep your appointments  IF you  cannot make your appt THEN please call or go online to " reschedule.  avoid  alcohol and illicit substances.  Look for the positive.  All is often relative-seek balance  Call sooner if needed : [phone]   Call 911 or go to Emergency Room  (ER)  if any acute concerns        >> BACKGROUND << (from 11-2-2021 Psyc Eval):    Referred by: (Whose idea to come see Psychiatry) :  Admin transfer as Bozena Machuca MD left [facility]    CHIEF COMPLAINT :  Continuity of care / has been taking Adderall XR 10 mg and Lexapro 20 mg ; desires reflls of same    HISTORY:         Alexa Araujo a 26 y.o.  female presents today as referred by Bozena Machuca MD    Panic attack reports monthly x 10 min     Father passed away in February 18 2019 from stroke; dad had history cirrhosis. Pt had subsequently Has been struggling with problems with motivation, and getting up and going usually when her dad's death.    She has not opted for counselor tho such offerred yet she politely declines aware can re request.    Ms Araujo reports that she doing ok on med regimen; likes such.     Excerpt of  Bozena Machuca MD initial Psyc Eval of from: 1-2-2019 :    22 yo having problems with execution, she can't follow through.  Patient feels that she has anxiety and so overwhelmed that she can't stay on the task. Patient has a history of anxiety attacks, is on Celexa.     Patient has had problems with sleep, recently switched Celexa to the morning and sleeping better, since the switch. Unsure if there has been much benefit on the medication. 6 months to a year started having this problem. Father was diagnosed with cirrhosis in September 28 of this year (2018). Has a boyfriend for 2 1/2 years has a hard time balancing this with school. Started having difficulties in school after third semester because she could not balance work and school. Finds that she has a difficult time with motivation. Doesn't want to do anything, she just sleeps all day and watches television.     Mom said that as a  "child in school she burned a lot of excess energy in sports, mom remembers that she had to keep her on a tight schedule. Patient had conduct issues as a child she was restlessness and talk out of turn. Her school work was done really quickly, and a lot of mistakes when she was little.     Currently is a civil engineering major. Plan is to get a job after school.     Has mom fiancee several friends     Panic Attacks: none in while (in past monthly)     Current Issues:   Started 2015 /  Last semester \[employer]\"" Civil Engineering degree; grew up [place]  Currently is a civil engineering major. Plan is to get a job after school.      Panic Attacks:     Frequency:monthly     No specific stressor      palpitations, pounding heart, or accelerated heart rate ?  y  sweating ? n  trembling or shaking ? y  sensations of shortness of breath or smothering ? y  feeling of choking ? n  chest pain or discomfort ? n  nausea or abdominal distress ?n  feeling dizzy, unsteady, lightheaded, or faint ? n  derealization (feelings of unreality) or depersonalization (being detached from oneself) ? n  fear of losing control ? n  fear of dying ? n  paresthesias (numbness or tingling sensations) ? n  chills or hot flushes ? y hot     Agoraphobia ? mild     Current Issues:    Says she soon to finish \[employer]\"" Civil Engineering degree; grew up [place]     Self Rating Scales: (Such submitted for scanning) :      [employer] Framework: a "bio-psycho-social" screening form for use as clinical raw data. / (submitted for scanning)      ysabel Yip MD     Physical Abuse: N      Sexual abuse  N     Psychosis: n     Substance Use:     Alcohol: social rare      Cannabis :n     Tobacco: n     Cocaine: n  Benzo:n  Opioid: n  Ecstasy:n  Other:n     PAST PSYCHIATRIC HISTORY:      Inpatient: n   Outpatient: (incl. Primary Care): Bozena Simpson Psyc MD     Allergy Review:   Review of patient's allergies indicates:  No Known Allergies         "

## 2022-04-10 ENCOUNTER — HISTORICAL (OUTPATIENT)
Dept: ADMINISTRATIVE | Facility: HOSPITAL | Age: 27
End: 2022-04-10
Payer: COMMERCIAL

## 2022-04-26 VITALS
BODY MASS INDEX: 18.06 KG/M2 | WEIGHT: 115.06 LBS | HEIGHT: 67 IN | OXYGEN SATURATION: 99 % | SYSTOLIC BLOOD PRESSURE: 110 MMHG | DIASTOLIC BLOOD PRESSURE: 50 MMHG

## 2022-05-02 NOTE — HISTORICAL [facility] CERNER
This is a historical note converted from [place]. Formatting and pictures may have been removed.  Please reference Cerrafaela for original formatting and attached multimedia. Chief Complaint  New patient- Annual wellness physical- NPO  History of Present Illness  New Patient presents for wellness CPX.?  She is feeling well.  She sleeps fair, about 6 hours.  Her appetite is good.?  She is majoring in engineering at [employer]?and will graduate in the spring of next year. ?Works at [employer].  Lives in [place].  She is engaged. No children.?  No ETOH.  No tobacco.  GYN: Luis Felipe HOWE.  Psychiatry:?Post MD.  Her mother has hyperthyroidism. ?She is interested in lab work today. ?She has a longstanding history of anxiety--she is taking Lexapro and working well.? She was diagnosed with ADD a few years ago and is taking Adderall--working well.? She has lost 10 to 15 pounds unintentionally?3 months ago.? Her normal weight?is?usually between 120-130 lbs.? She is now 115 lb.? appetite is good,?eats 2-3 meals a day.? She has recently gained 3 pounds back?over the last month.? Denies CP, SOB, cough, recent infection, hair loss, weight gain, headaches, syncope, body aches?drug use, stress, elevated anxiety, depression, SI, HI, palpitations, tachycardia, voice hoarseness,?neck enlargement.  Review of Systems  GENERAL: +weight loss, no?weight gain, no fever, +fatigue, no chills, no night sweats  HEENT: No sore throat, no ear pain, no sinus pressure, no nasal congestion, no rhinorrhea, no decreased hearing, no snoring  VISION: No vision changes, no blurry vision, no double vision, no glaucoma, no cataracts, +glasses, no contacts  LAST EYE EXAM: 1-2 years ago  NECK: no LAD  CARDIAC: No chest pain, no palpitations, no dyspnea on exertion, no orthopnea  RESPIRATORY: No cough, no wheezing, no sputum production, no?SOB  GI: No abdominal pain, no N/V, no constipation, no diarrhea, no blood in stool,?(- )?family history of Colon Ca  : No dysuria,  no hematuria, no frequency, no urgency, no incontinence, no vaginal discharge or abnormal vaginal bleeding  MUSC/SKEL: No myalgia, no weakness, no edema, no arthralgia, no joint swelling/effusions  SKIN: No rashes, no hives, no itching, no sores  NEURO: No HA, no numbness, no tingling, no weakness, no dizziness  PSYCH: History of anxiety?anxiety, no depression, no?irritability, no panic attacks,?no s/i, no h/i, no?hallucinations  ENDO: No polyuria, no polyphagia,? no polydipsia  HEME: No bruising, no bleeding disorders, no signs of anemia.?  Physical Exam  Vitals & Measurements  T:?36.9? ?C (Oral)? HR:?108(Peripheral)? BP:?110/50? SpO2:?99%?  HT:?170.00?cm? WT:?52.200?kg? BMI:?18.06? LMP:?10/27/2021 00:00 CDT?  General: Well developed, well nourished in no apparent distress, alert and oriented x3  Skin:?No rash or abnormal?lesions  HEENT:?Normocephalic, PERRLA, EOMI, mouth WNL, throat WNL, nares normal, EAC and TM WNL bilaterally  Neck:?FROM, no LAD, no thyroid abnormalities?palpable  Chest:?CTA?bilaterally, no wheezes crackles or rubs  Cardiac: RRR,?no murmurs, rubs, gallops  Abdomen: Soft, nontender,?nondistended, NBSx4, no rebound tenderness or guarding, no HSM  Extremities:?No clubbing, cyanosis, or edema.? Joints WNL, +2 DP/PT pulses bilaterally  Neuro: No sensory or motor defects noted. CN II-XII intact. Gait WNL.  Genital: Defer to GYN  Assessment/Plan  1.?Wellness examination?Z00.00  CBC, CMP, Lipids, UA, TSH, T4?ordered today.?  Declines flu shot.  GYN: Luis Felipe HOWE.  Psychiatry: Post MD.  Ordered:  CBC w/ Auto Diff, Routine collect, 10/29/21 12:07:00 CDT, Blood, Order for future visit, Stop date 10/29/21 12:07:00 CDT, Lab Collect, Wellness examination  Anxiety  ADD (attention deficit disorder)  Family history of hyperthyroidism  Weight loss, 10/29/21 12:07:00...  Clinic Follow-Up Wellness, *Est. 10/29/22 3:00:00 CDT, Order for future visit, Wellness examination, HLink AFP  Comprehensive Metabolic Panel,  Routine collect, 10/29/21 12:07:00 CDT, Blood, Order for future visit, Stop date 10/29/21 12:07:00 CDT, Lab Collect, Wellness examination  Anxiety  ADD (attention deficit disorder)  Family history of hyperthyroidism  Weight loss, 10/29/21 12:07:00...  Free T4, Routine collect, 10/29/21 12:07:00 CDT, Blood, Order for future visit, Stop date 10/29/21 12:07:00 CDT, Lab Collect, Wellness examination  Anxiety  Weight loss  Family history of hyperthyroidism, 10/29/21 12:07:00 CDT  Hemoglobin A1c, Routine collect, 10/29/21 12:07:00 CDT, Blood, Order for future visit, Stop date 10/29/21 12:07:00 CDT, Lab Collect, Wellness examination  Weight loss  ADD (attention deficit disorder)  Anxiety, 10/29/21 12:07:00 CDT  Lab Collection Request, 10/29/21 12:07:00 CDT, INK AMB - AFP, 10/29/21 12:07:00 CDT, Wellness examination  Anxiety  ADD (attention deficit disorder)  Family history of hyperthyroidism  Weight loss  Lipid Panel, Routine collect, 10/29/21 12:07:00 CDT, Blood, Order for future visit, Stop date 10/29/21 12:07:00 CDT, Lab Collect, Wellness examination, 10/29/21 12:07:00 CDT  Preventative Health Care New 18-39 years [id number] PC, Wellness examination  Anxiety  Family history of hyperthyroidism  ADD (attention deficit disorder)  Weight loss, HLINK AMB - AFP, 10/29/21 12:07:00 CDT  Thyroid Stimulating Hormone, Routine collect, 10/29/21 12:07:00 CDT, Blood, Order for future visit, Stop date 10/29/21 12:07:00 CDT, Lab Collect, Wellness examination  Anxiety  Weight loss  Family history of hyperthyroidism, 10/29/21 12:07:00 CDT  Urinalysis no Reflex, Routine collect, Urine, Order for future visit, 10/29/21 12:07:00 CDT, Stop date 10/29/21 12:07:00 CDT, Nurse collect, Wellness examination  ?  2.?Anxiety?F41.9  Followed by psychiatry.  Ordered:  CBC w/ Auto Diff, Routine collect, 10/29/21 12:07:00 CDT, Blood, Order for future visit, Stop date 10/29/21 12:07:00 CDT, Lab Collect, Wellness examination  Anxiety   ADD (attention deficit disorder)  Family history of hyperthyroidism  Weight loss, 10/29/21 12:07:00...  Comprehensive Metabolic Panel, Routine collect, 10/29/21 12:07:00 CDT, Blood, Order for future visit, Stop date 10/29/21 12:07:00 CDT, Lab Collect, Wellness examination  Anxiety  ADD (attention deficit disorder)  Family history of hyperthyroidism  Weight loss, 10/29/21 12:07:00...  Free T4, Routine collect, 10/29/21 12:07:00 CDT, Blood, Order for future visit, Stop date 10/29/21 12:07:00 CDT, Lab Collect, Wellness examination  Anxiety  Weight loss  Family history of hyperthyroidism, 10/29/21 12:07:00 CDT  Hemoglobin A1c, Routine collect, 10/29/21 12:07:00 CDT, Blood, Order for future visit, Stop date 10/29/21 12:07:00 CDT, Lab Collect, Wellness examination  Weight loss  ADD (attention deficit disorder)  Anxiety, 10/29/21 12:07:00 CDT  Lab Collection Request, 10/29/21 12:07:00 CDT, INK AMB - AFP, 10/29/21 12:07:00 CDT, Wellness examination  Anxiety  ADD (attention deficit disorder)  Family history of hyperthyroidism  Weight loss  Preventative Health Care New 18-39 years [id number] PC, Wellness examination  Anxiety  Family history of hyperthyroidism  ADD (attention deficit disorder)  Weight loss, INK AMB - AFP, 10/29/21 12:07:00 CDT  Thyroid Stimulating Hormone, Routine collect, 10/29/21 12:07:00 CDT, Blood, Order for future visit, Stop date 10/29/21 12:07:00 CDT, Lab Collect, Wellness examination  Anxiety  Weight loss  Family history of hyperthyroidism, 10/29/21 12:07:00 CDT  ?  3.?ADD (attention deficit disorder)?F98.8  Followed by psychiatry.  Ordered:  CBC w/ Auto Diff, Routine collect, 10/29/21 12:07:00 CDT, Blood, Order for future visit, Stop date 10/29/21 12:07:00 CDT, Lab Collect, Wellness examination  Anxiety  ADD (attention deficit disorder)  Family history of hyperthyroidism  Weight loss, 10/29/21 12:07:00...  Comprehensive Metabolic Panel, Routine collect, 10/29/21 12:07:00 CDT,  Blood, Order for future visit, Stop date 10/29/21 12:07:00 CDT, Lab Collect, Wellness examination  Anxiety  ADD (attention deficit disorder)  Family history of hyperthyroidism  Weight loss, 10/29/21 12:07:00...  Hemoglobin A1c, Routine collect, 10/29/21 12:07:00 CDT, Blood, Order for future visit, Stop date 10/29/21 12:07:00 CDT, Lab Collect, Wellness examination  Weight loss  ADD (attention deficit disorder)  Anxiety, 10/29/21 12:07:00 CDT  Lab Collection Request, 10/29/21 12:07:00 CDT, HLINK AMB - AFP, 10/29/21 12:07:00 CDT, Wellness examination  Anxiety  ADD (attention deficit disorder)  Family history of hyperthyroidism  Weight loss  [facility] New 18-39 years [id number] PC, Wellness examination  Anxiety  Family history of hyperthyroidism  ADD (attention deficit disorder)  Weight loss, HLINK AMB - AFP, 10/29/21 12:07:00 CDT  ?  4.?Weight loss?R63.4  Labs today.  Family history of hyperthyroidism as below.  3 meals a day with frequent snacking.  Additional work-up to be determined pending labs.  Ordered:  CBC w/ Auto Diff, Routine collect, 10/29/21 12:07:00 CDT, Blood, Order for future visit, Stop date 10/29/21 12:07:00 CDT, Lab Collect, Wellness examination  Anxiety  ADD (attention deficit disorder)  Family history of hyperthyroidism  Weight loss, 10/29/21 12:07:00...  Comprehensive Metabolic Panel, Routine collect, 10/29/21 12:07:00 CDT, Blood, Order for future visit, Stop date 10/29/21 12:07:00 CDT, Lab Collect, Wellness examination  Anxiety  ADD (attention deficit disorder)  Family history of hyperthyroidism  Weight loss, 10/29/21 12:07:00...  Free T4, Routine collect, 10/29/21 12:07:00 CDT, Blood, Order for future visit, Stop date 10/29/21 12:07:00 CDT, Lab Collect, Wellness examination  Anxiety  Weight loss  Family history of hyperthyroidism, 10/29/21 12:07:00 CDT  Hemoglobin A1c, Routine collect, 10/29/21 12:07:00 CDT, Blood, Order for future visit, Stop date 10/29/21  12:07:00 CDT, Lab Collect, Wellness examination  Weight loss  ADD (attention deficit disorder)  Anxiety, 10/29/21 12:07:00 CDT  Lab Collection Request, 10/29/21 12:07:00 CDT, HLINK AMB - AFP, 10/29/21 12:07:00 CDT, Wellness examination  Anxiety  ADD (attention deficit disorder)  Family history of hyperthyroidism  Weight loss  Preventative Health Care New 18-39 years [id number] PC, Wellness examination  Anxiety  Family history of hyperthyroidism  ADD (attention deficit disorder)  Weight loss, HLINK AMB - AFP, 10/29/21 12:07:00 CDT  Thyroid Stimulating Hormone, Routine collect, 10/29/21 12:07:00 CDT, Blood, Order for future visit, Stop date 10/29/21 12:07:00 CDT, Lab Collect, Wellness examination  Anxiety  Weight loss  Family history of hyperthyroidism, 10/29/21 12:07:00 CDT  ?  5.?Family history of hyperthyroidism?Z83.49  Labs today.  Ordered:  CBC w/ Auto Diff, Routine collect, 10/29/21 12:07:00 CDT, Blood, Order for future visit, Stop date 10/29/21 12:07:00 CDT, Lab Collect, Wellness examination  Anxiety  ADD (attention deficit disorder)  Family history of hyperthyroidism  Weight loss, 10/29/21 12:07:00...  Comprehensive Metabolic Panel, Routine collect, 10/29/21 12:07:00 CDT, Blood, Order for future visit, Stop date 10/29/21 12:07:00 CDT, Lab Collect, Wellness examination  Anxiety  ADD (attention deficit disorder)  Family history of hyperthyroidism  Weight loss, 10/29/21 12:07:00...  Free T4, Routine collect, 10/29/21 12:07:00 CDT, Blood, Order for future visit, Stop date 10/29/21 12:07:00 CDT, Lab Collect, Wellness examination  Anxiety  Weight loss  Family history of hyperthyroidism, 10/29/21 12:07:00 CDT  Lab Collection Request, 10/29/21 12:07:00 CDT, HLINK AMB - AFP, 10/29/21 12:07:00 CDT, Wellness examination  Anxiety  ADD (attention deficit disorder)  Family history of hyperthyroidism  Weight loss  Preventative Health Care New 18-39 years [id number] PC, Wellness examination  Anxiety   Family history of hyperthyroidism  ADD (attention deficit disorder)  Weight loss, HLINK AMB - AFP, 10/29/21 12:07:00 CDT  Thyroid Stimulating Hormone, Routine collect, 10/29/21 12:07:00 CDT, Blood, Order for future visit, Stop date 10/29/21 12:07:00 CDT, Lab Collect, Wellness examination  Anxiety  Weight loss  Family history of hyperthyroidism, 10/29/21 12:07:00 CDT  ?  Referrals  Clinic Follow-Up Wellness, *Est. 10/29/22 3:00:00 CDT, Order for future visit, Wellness examination, HLink AFP   Problem List/Past Medical History  Ongoing  Anxiety  Historical  ADD - Attention deficit disorder  Anxiety  Procedure/Surgical History  Eustachian tuboplasty  Extraction of impacted wisdom tooth  Tonsillectomy and adenoidectomy   Medications  amphetamine-dextroamphetamine 10 mg oral capsule, extended release, 10 mg= 1 cap(s), Oral, qAM  escitalopram 20 mg oral tablet, 20 mg= 1 tab(s), Oral, Daily  ESTARYLLA 0.25-0.035 MG TABLET, 1 tab(s), Oral, Daily  Allergies  No Known Medication Allergies  Social History  Abuse/Neglect  No, 10/29/2021  Alcohol  Never, 10/29/2021  Employment/School  Employed, Part time, Student, Work/School description: College;  at [employer]., 10/29/2021  Exercise  Home/Environment  Lives with Room mate. Living situation: Home/Independent., 10/29/2021  Nutrition/Health  Regular, Fair, 10/29/2021  Sexual  Sexually active: Yes. Number of current partners 1. Uses condoms: Yes. Other contraceptive use: BIRTH CONTROL., 08/06/2018  Substance Use  Never, 08/06/2018  Tobacco  Former smoker, quit more than 30 days ago, N/A, 10/29/2021  Light tobacco smoker Use:., 08/06/2018  Family History  Alzheimers disease: Grandfather.  Breast cancer: Mother.  Hypertension.: Father.  Hyperthyroidism.: Mother.  Rheumatoid arthritis: Mother.  Immunizations  Vaccine Date Status   varicella virus vaccine 01/28/2013 Recorded   varicella virus vaccine 08/24/2009 Recorded   meningococcal conjugate vaccine 08/24/2009 Recorded    poliovirus vaccine, inactivated 05/30/2001 Recorded   pneumococcal 7-valent vaccine 05/30/2001 Recorded   measles/mumps/rubella virus vaccine 05/30/2001 Recorded   diphtheria/pertussis, acel/tetanus ped 05/30/2001 Recorded   diphtheria/pertussis, acel/tetanus ped 07/21/1999 Recorded   measles/mumps/rubella virus vaccine 06/17/1998 Recorded   diphtheria/pertussis, acel/tetanus ped 06/17/1998 Recorded   poliovirus vaccine, inactivated 1997 Recorded   hepatitis B pediatric vaccine 1997 Recorded   diphtheria/pertussis, acel/tetanus ped 1997 Recorded   poliovirus vaccine, inactivated 1997 Recorded   poliovirus vaccine, inactivated 1997 Recorded   hepatitis B pediatric vaccine 1997 Recorded   diphtheria/pertussis, acel/tetanus ped 1997 Recorded   hepatitis B pediatric vaccine 1997 Recorded   Health Maintenance  Health Maintenance  ???Pending?(in the next year)  ??? ??OverDue  ??? ? ? ?Smoking Cessation due??01/01/21??and every 1??year(s)  ??? ? ? ?Alcohol Misuse Screening due??01/02/21??and every 1??year(s)  ??? ??Due?  ??? ? ? ?ADL Screening due??10/29/21??and every 1??year(s)  ??? ? ? ?Tetanus Vaccine due??10/29/21??and every 10??year(s)  ??? ??Due In Future?  ??? ? ? ?Obesity Screening not due until??01/01/22??and every 1??year(s)  ???Satisfied?(in the past 1 year)  ??? ??Satisfied?  ??? ? ? ?Blood Pressure Screening on??10/29/21.??Satisfied by Astrid Baez LPN  ??? ? ? ?Body Mass Index Check on??10/29/21.??Satisfied by Astrid Baez LPN  ??? ? ? ?Cervical Cancer Screening on??12/18/20.??Satisfied by Mick Levy  ??? ? ? ?Influenza Vaccine on??10/29/21.??Satisfied by Astrid Baez LPN  ??? ? ? ?Obesity Screening on??10/29/21.??Satisfied by Astrid Baez LPN  ?      Patients condition discussed in detail with nurse practitioner.?  I have reviewed and agree with plan of care and follow-up.

## 2022-06-13 ENCOUNTER — OFFICE VISIT (OUTPATIENT)
Dept: PSYCHIATRY | Facility: CLINIC | Age: 27
End: 2022-06-13
Payer: COMMERCIAL

## 2022-06-13 DIAGNOSIS — F90.0 ATTENTION DEFICIT HYPERACTIVITY DISORDER (ADHD), PREDOMINANTLY INATTENTIVE TYPE: ICD-10-CM

## 2022-06-13 DIAGNOSIS — F41.9 ANXIETY DISORDER, UNSPECIFIED TYPE: ICD-10-CM

## 2022-06-13 PROCEDURE — 1159F PR MEDICATION LIST DOCUMENTED IN MEDICAL RECORD: ICD-10-PCS | Mod: CPTII,95,, | Performed by: PSYCHIATRY & NEUROLOGY

## 2022-06-13 PROCEDURE — 99214 OFFICE O/P EST MOD 30 MIN: CPT | Mod: 95,,, | Performed by: PSYCHIATRY & NEUROLOGY

## 2022-06-13 PROCEDURE — 99214 PR OFFICE/OUTPT VISIT, EST, LEVL IV, 30-39 MIN: ICD-10-PCS | Mod: 95,,, | Performed by: PSYCHIATRY & NEUROLOGY

## 2022-06-13 PROCEDURE — 1159F MED LIST DOCD IN RCRD: CPT | Mod: CPTII,95,, | Performed by: PSYCHIATRY & NEUROLOGY

## 2022-06-13 PROCEDURE — 1160F RVW MEDS BY RX/DR IN RCRD: CPT | Mod: CPTII,95,, | Performed by: PSYCHIATRY & NEUROLOGY

## 2022-06-13 PROCEDURE — 1160F PR REVIEW ALL MEDS BY PRESCRIBER/CLIN PHARMACIST DOCUMENTED: ICD-10-PCS | Mod: CPTII,95,, | Performed by: PSYCHIATRY & NEUROLOGY

## 2022-06-13 RX ORDER — DEXTROAMPHETAMINE SACCHARATE, AMPHETAMINE ASPARTATE MONOHYDRATE, DEXTROAMPHETAMINE SULFATE AND AMPHETAMINE SULFATE 2.5; 2.5; 2.5; 2.5 MG/1; MG/1; MG/1; MG/1
10 CAPSULE, EXTENDED RELEASE ORAL DAILY
Qty: 30 CAPSULE | Refills: 0 | Status: SHIPPED | OUTPATIENT
Start: 2022-07-29 | End: 2022-08-28

## 2022-06-13 RX ORDER — ESCITALOPRAM OXALATE 20 MG/1
20 TABLET ORAL DAILY
Qty: 90 TABLET | Refills: 0 | Status: SHIPPED | OUTPATIENT
Start: 2022-06-13 | End: 2022-09-08 | Stop reason: SDUPTHER

## 2022-06-13 RX ORDER — DEXTROAMPHETAMINE SACCHARATE, AMPHETAMINE ASPARTATE MONOHYDRATE, DEXTROAMPHETAMINE SULFATE AND AMPHETAMINE SULFATE 2.5; 2.5; 2.5; 2.5 MG/1; MG/1; MG/1; MG/1
10 CAPSULE, EXTENDED RELEASE ORAL DAILY
Qty: 30 CAPSULE | Refills: 0 | Status: SHIPPED | OUTPATIENT
Start: 2022-06-30 | End: 2022-07-30

## 2022-06-13 RX ORDER — DEXTROAMPHETAMINE SACCHARATE, AMPHETAMINE ASPARTATE MONOHYDRATE, DEXTROAMPHETAMINE SULFATE AND AMPHETAMINE SULFATE 2.5; 2.5; 2.5; 2.5 MG/1; MG/1; MG/1; MG/1
10 CAPSULE, EXTENDED RELEASE ORAL DAILY
Qty: 30 CAPSULE | Refills: 0 | Status: SHIPPED | OUTPATIENT
Start: 2022-08-27 | End: 2022-09-08 | Stop reason: SDUPTHER

## 2022-06-13 NOTE — PATIENT INSTRUCTIONS
PLAN:     FOLLOW UP D Post MD : Sept 8 2022 at 4 pm Telehealth      Meds: see after visit summary renewed Adderall XR 10 mg and Lexapro 20 mg    References:     Relaxation stress reduction workbook: CALLUM Heath PhD ( used: $7-10)    Feeling Good Website: Elder Norris MD / [email or web address] website (free) / [email or web address]. PODCASTS    Anxiety &  phobia workbook by MATTHEW Solo PhD  (web retailers: used: $ 7-10)    [place]: Path to Better Sleep : https://www.veterantraining.[place].gov/insomnia/ (free)     Pt expressed appreciation for the visit today and did not have further question at this time though pt  was still informed to:     Call  if problems.    Call / Report Side Effects to Psyc MD     Encouraged to follow up with primary care / Gen Med MD for continued monitoring of general health and wellness.    Understanding was expressed; and no further concerns nor questions were raised at this time.     remember healthy self care:   eat right  attempt adequate rest   HANDWASHING / encourage such jayne. During this corona virus time   walk or light exercise within reason and as your general med team approves  read or explore any of reference materials / homework mentioned  reach out (I.e.,  connect with)  others who nuture and bring out best in you  avoid risky behaviors  keep your appointments  IF you  cannot make your appt THEN please call or go online to reschedule.  avoid  alcohol and illicit substances.  Look for the positive.  All is often relative-seek balance  Call sooner if needed : [phone]   Call 911 or go to Emergency Room  (ER)  if any acute concerns

## 2022-06-13 NOTE — PROGRESS NOTES
Disclaimer: Evaluation and treatment is based on information presented to date. Any new information may affect assessment and findings.     Each patient to whom  medical services are provided by telemedicine is: (1) informed of the relationship between the physician and patient and the respective role of any other health care provider with respect to management of the patient; and (2) notified that he or she may decline to receive medical services by telemedicine and may withdraw from such care at any time.    I also informed patient of the following:   Elder Whitley MD:  [id number]    Location:  Pt is resident of [place], [place] / she is and visiting [place]ryan in [facility] [place]  on vacation and returning to [place] tomorrow 6-14-22    D Post MD is employed by:  [facility] [place]      If technology issues arise during call,  pt informed MD will attempt to call pt back or pt may call office phone: [phone]    Pt informed that IF acute concerns to: Dial 911 or go to nearest Emergency Room (ER)    If pt has questions related to privacy practices or records: pt instructed to contact [employer] Information Department: [phone]    Alexa Araujo   1995   06/13/2022      Father passing away  Feb 18 2019 Due to CIRRHOSIS ; she was one who mentioned ; handing ok; not tearful says she has supports:  mariah family friends     Note: she graduated [employer] 2022     S: Patient's Own Perception of Condition (& Side Effects) : no med complaints     O:      CURRENT PRESENTATION:      taking Adderall XR 10 mg and Lexapro 20 mg / says meds helping / no side effects      graduated  this semester [employer]    Smiling cheerful     alert calm     polite     goal directed    No SI /   No HI    Comments Medication: likes her medication     Constitutional Health Concerns:       Laboratory Data  No visits with results within 1 Month(s)  from this visit.   Latest known visit with results is:   Historical on 10/29/2021   Component Date Value Ref Range Status    Albumin/Globulin Ratio 10/29/2021 1.61  1.50 - 2.50 Final    Alanine Aminotransferase 10/29/2021 18.0  7.0 - 52.0 unit/L Final    Alkaline Phosphatase 10/29/2021 54.0  38.0 - 126.0 unit/L Final    Albumin Level 10/29/2021 4.5  3.4 - 5.0 gm/dL Final    Aspartate Aminotransferase 10/29/2021 27.0  15.0 - 37.0 unit/L Final    Bilirubin Total 10/29/2021 0.3  0.2 - 1.0 mg/dL Final    Blood Urea Nitrogen 10/29/2021 12.0  7.0 - 18.0 mg/dL Final    Bilirubin Direct 10/29/2021 0.1  0.0 - 0.5 mg/dL Final    Bilirubin Indirect 10/29/2021 0.2  mg/dL Final    Calcium Level Total 10/29/2021 9.3  8.5 - 10.0 mg/dL Final    Chloride 10/29/2021 102.0  98.0 - 107.0 mmol/L Final    Carbon Dioxide 10/29/2021 26.0  21.0 - 32.0 mmol/L Final    Creatinine 10/29/2021 0.56 (A) 0.60 - 1.30 mg/dL Final    Globulin 10/29/2021 2.8  1.2 - 3.0 gm/dL Final    Glucose Level 10/29/2021 102.0  74.0 - 106.0 mg/dL Final    Sodium Level 10/29/2021 138.0  136.0 - 145.0 mmol/L Final    Potassium Level 10/29/2021 4.4  3.5 - 5.1 mmol/L Final    Protein Total 10/29/2021 7.3  6.4 - 8.2 gm/dL Final    Cholesterol/HDL Ratio 10/29/2021 4.7   Final    Cholesterol Total 10/29/2021 253.0 (A) 0.0 - 200.0 mg/dL Final    HDL Cholesterol 10/29/2021 54.0  35.0 - 60.0 mg/dL Final    LDL Cholesterol 10/29/2021 172.0 (A) 0.0 - 129.0 mg/dL Final    Triglyceride 10/29/2021 51.0  30.0 - 150.0 mg/dL Final    Very Low Density Lipoprotein 10/29/2021 10.2   Final    Estimated Average Glucose 10/29/2021 100  <<=115 mg/dL Final    Hemoglobin A1c 10/29/2021 5.1  4.4 - 6.4 % Final    Thyroxine Free 10/29/2021 1.15  0.76 - 1.46 ng/dL Final    Thyroid Stimulating Hormone 10/29/2021 0.401  0.350 - 4.940 mIU/mL Final    Estimated GFR-Non  10/29/2021 >60  mL/min/1.73 m2 Final    Estimated GFR- 10/29/2021  >60  mL/min/1.73 m2 Final    Mono # 10/29/2021 0.6  0.1 - 1.3 x10(3)/mcL Final    Lymph # 10/29/2021 2.8  0.6 - 3.4 x10(3)/mcL Final    Lymph % 10/29/2021 34.1  13.0 - 40.0 % Final    Neut % 10/29/2021 59.0  47.0 - 80.0 % Final    Mono % 10/29/2021 6.9  0.1 - 24.0 % Final    Abs Neut 10/29/2021 4.8  2.1 - 9.2 x10(3)/mcL Final    MCH 10/29/2021 30.1  27.0 - 31.2 pg Final    MCHC 10/29/2021 33  32 - 36 gm/dL Final    MPV 10/29/2021 10.0  9.4 - 12.4 fL Final    MCV 10/29/2021 92  80 - 94 fL Final    Hct 10/29/2021 43.1  37.0 - 47.0 % Final    Hgb 10/29/2021 14.2  12.0 - 16.0 gm/dL Final    WBC 10/29/2021 8.2  4.5 - 11.5 x10(3)/mcL Final    RBC 10/29/2021 4.71  4.20 - 5.40 x10(6)/mcL Final    Platelet 10/29/2021 231  130 - 400 x10(3)/mcL Final    RDW 10/29/2021 12.4  11.5 - 17.0 % Final    Urobilinogen, UA 10/29/2021 0.2  >0.2 mg/dL Final    Squam Epithel, UA 10/29/2021 Rare  /LPF Final    Color, UA 10/29/2021 Yellow  >Yellow Final    Glucose, UA 10/29/2021 Negative  >Negative mg/dL Final    Appearance, UA 10/29/2021 Clear  >Clear Final    Ketones, UA 10/29/2021 Negative  >Negative mg/dL Final    Leukocytes, UA 10/29/2021 Negative  >Negative unit/L Final    pH, UA 10/29/2021 7.5  >5.0 - 8.0 Final    Bilirubin (UA) 10/29/2021 Negative  >Negative mg/dL Final    Specific Gravity,UA 10/29/2021 1.015  >1.005 - 1.030 Final    Occult Blood UA 10/29/2021 Negative  >Negative unit/L Final    RBC, UA 10/29/2021 None Seen  >0 - 3 /HPF Final    Protein, UA 10/29/2021 Negative  >Negative mg/dL Final        Mental Status Exam:      Appearance: casual   Oriented: x 3   Attitude: cooperative   Eye Contact: good  Behavior: calm     Mood: says feeling better  Cognition: alert  Concentration: grossly intact   Affect: appropriate range      Anxiety: mild     Thought Process: goal directed     Speech:       Volume : WNL       Quantity WNL       Quality: appears to openly answer questions      Threats: no SI / no  HI     Psychosis: denies all      Estimate of Intellectual Function: above average   Impulse Control: no thoughts of harm to self/ others      Musculoskeletal: no report of tremor      Social: engaged to pre med student  / graduating [employer] / has supportive mother and friends       Patient Active Problem List   Diagnosis    Generalized anxiety disorder with panic attacks    ADHD    Grief    Anxiety disorder    Panic attacks        Current Outpatient Medications:     [START ON 6/30/2022] dextroamphetamine-amphetamine (ADDERALL XR) 10 MG 24 hr capsule, Take 1 capsule (10 mg total) by mouth once daily., Disp: 30 capsule, Rfl: 0    [START ON 7/29/2022] dextroamphetamine-amphetamine (ADDERALL XR) 10 MG 24 hr capsule, Take 1 capsule (10 mg total) by mouth once daily., Disp: 30 capsule, Rfl: 0    [START ON 8/27/2022] dextroamphetamine-amphetamine (ADDERALL XR) 10 MG 24 hr capsule, Take 1 capsule (10 mg total) by mouth once daily., Disp: 30 capsule, Rfl: 0    EScitalopram oxalate (LEXAPRO) 20 MG tablet, Take 1 tablet (20 mg total) by mouth once daily., Disp: 90 tablet, Rfl: 0    ESTARYLLA 0.25-35 mg-mcg per tablet, Take 1 tablet by mouth once daily., Disp: , Rfl: 10     Social History     Tobacco Use   Smoking Status Unknown If Ever Smoked   Smokeless Tobacco Not on file        Review of patient's allergies indicates:  No Known Allergies     ASSESSMENT:   Encounter Diagnoses   Name Primary?    Attention deficit hyperactivity disorder (ADHD), predominantly inattentive type     Anxiety disorder, unspecified type; some generalized features      Patient Instructions     PLAN:     FOLLOW UP D Post MD : Sept 8 2022 at 4 pm Telehealth      Meds: see after visit summary renewed Adderall XR 10 mg and Lexapro 20 mg    References:     Relaxation stress reduction workbook: CALLUM Heath PhD ( used: $7-10)    Feeling Good Website: Elder Norris MD / [email or web address] website (free) / jayne. PODCASTS    Anxiety &   phobia workbook by MATTHEW Solo PhD  (web retailers: used: $ 7-10)    [place]: Path to Better Sleep : https://www.veterantraining.[place].gov/insomnia/ (free)     Pt expressed appreciation for the visit today and did not have further question at this time though pt  was still informed to:     Call  if problems.    Call / Report Side Effects to Brigette HOWE     Encouraged to follow up with primary care / Gen Med MD for continued monitoring of general health and wellness.    Understanding was expressed; and no further concerns nor questions were raised at this time.     remember healthy self care:   eat right  attempt adequate rest   HANDWASHING / encourage such jayne. During this corona virus time   walk or light exercise within reason and as your general med team approves  read or explore any of reference materials / homework mentioned  reach out (I.e.,  connect with)  others who nuture and bring out best in you  avoid risky behaviors  keep your appointments  IF you  cannot make your appt THEN please call or go online to reschedule.  avoid  alcohol and illicit substances.  Look for the positive.  All is often relative-seek balance  Call sooner if needed : [phone]   Call 911 or go to Emergency Room  (ER)  if any acute concerns            >> BACKGROUND << (from 11-2-2021 Brigette Eval):    Referred by: (Whose idea to come see Psychiatry) :  Admin transfer as Bozena Machuca MD left [facility]    CHIEF COMPLAINT :  Continuity of care / has been taking Adderall XR 10 mg and Lexapro 20 mg ; desires reflls of same    HISTORY:         Alexa CALLUM Van a 26 y.o.  female presents today as referred by Bozena Machuca MD    Panic attack reports monthly x 10 min     Father passed away in February 18 2019 from stroke; dad had history cirrhosis. Pt had subsequently Has been struggling with problems with motivation, and getting up and going usually when her dad's death.    She has not opted for counselor tho such offerred yet she politely  declines aware can re request.    Ms Van reports that she doing ok on med regimen; likes such.     Excerpt of  Bozena Machuca MD initial Psyc Eval of from: 1-2-2019 :    24 yo having problems with execution, she can't follow through.  Patient feels that she has anxiety and so overwhelmed that she can't stay on the task. Patient has a history of anxiety attacks, is on Celexa.     Patient has had problems with sleep, recently switched Celexa to the morning and sleeping better, since the switch. Unsure if there has been much benefit on the medication. 6 months to a year started having this problem. Father was diagnosed with cirrhosis in September 28 of this year (2018). Has a boyfriend for 2 1/2 years has a hard time balancing this with school. Started having difficulties in school after third semester because she could not balance work and school. Finds that she has a difficult time with motivation. Doesn't want to do anything, she just sleeps all day and watches television.     Mom said that as a child in school she burned a lot of excess energy in sports, mom remembers that she had to keep her on a tight schedule. Patient had conduct issues as a child she was restlessness and talk out of turn. Her school work was done really quickly, and a lot of mistakes when she was little.     Currently is a civil engineering major. Plan is to get a job after school.     Has mom demetri several friends     Panic Attacks: none in while (in past monthly)     Current Issues:   Graduated  [employer] degree; congratulations given!  grew up [place] / Demetri lives [facility]     Panic Attacks:     Frequency:monthly     No specific stressor      palpitations, pounding heart, or accelerated heart rate ?  y  sweating ? n  trembling or shaking ? y  sensations of shortness of breath or smothering ? y  feeling of choking ? n  chest pain or discomfort ? n  nausea or abdominal distress ?n  feeling  "dizzy, unsteady, lightheaded, or faint ? n  derealization (feelings of unreality) or depersonalization (being detached from oneself) ? n  fear of losing control ? n  fear of dying ? n  paresthesias (numbness or tingling sensations) ? n  chills or hot flushes ? y hot     Agoraphobia ? mild     Current Issues:    Says she soon to finish [employer] Civil [employer] degree; grew up [place]     Self Rating Scales: (Such submitted for scanning) :      The [employer] Framework: a "bio-psycho-social" screening form for use as clinical raw data. / (submitted for scanning)      ysabel Yip MD     Physical Abuse: N      Sexual abuse  N     Psychosis: n     Substance Use:     Alcohol: social rare      Cannabis :n     Tobacco: n     Cocaine: n  Benzo:n  Opioid: n  Ecstasy:n  Other:n     PAST PSYCHIATRIC HISTORY:      Inpatient: n   Outpatient: (incl. Primary Care): Bozena Machuca MD     Allergy Review:   Review of patient's allergies indicates:  No Known Allergies         "

## 2022-09-08 ENCOUNTER — OFFICE VISIT (OUTPATIENT)
Dept: PSYCHIATRY | Facility: CLINIC | Age: 27
End: 2022-09-08
Payer: COMMERCIAL

## 2022-09-08 ENCOUNTER — PATIENT MESSAGE (OUTPATIENT)
Dept: PSYCHIATRY | Facility: CLINIC | Age: 27
End: 2022-09-08

## 2022-09-08 DIAGNOSIS — F41.9 ANXIETY DISORDER, UNSPECIFIED TYPE: ICD-10-CM

## 2022-09-08 DIAGNOSIS — F90.0 ATTENTION DEFICIT HYPERACTIVITY DISORDER (ADHD), PREDOMINANTLY INATTENTIVE TYPE: ICD-10-CM

## 2022-09-08 PROCEDURE — 99214 PR OFFICE/OUTPT VISIT, EST, LEVL IV, 30-39 MIN: ICD-10-PCS | Mod: 95,,, | Performed by: PSYCHIATRY & NEUROLOGY

## 2022-09-08 PROCEDURE — 99214 OFFICE O/P EST MOD 30 MIN: CPT | Mod: 95,,, | Performed by: PSYCHIATRY & NEUROLOGY

## 2022-09-08 RX ORDER — DEXTROAMPHETAMINE SACCHARATE, AMPHETAMINE ASPARTATE MONOHYDRATE, DEXTROAMPHETAMINE SULFATE AND AMPHETAMINE SULFATE 2.5; 2.5; 2.5; 2.5 MG/1; MG/1; MG/1; MG/1
10 CAPSULE, EXTENDED RELEASE ORAL DAILY
Qty: 30 CAPSULE | Refills: 0 | Status: SHIPPED | OUTPATIENT
Start: 2022-11-07 | End: 2022-11-29 | Stop reason: SDUPTHER

## 2022-09-08 RX ORDER — DEXTROAMPHETAMINE SACCHARATE, AMPHETAMINE ASPARTATE MONOHYDRATE, DEXTROAMPHETAMINE SULFATE AND AMPHETAMINE SULFATE 2.5; 2.5; 2.5; 2.5 MG/1; MG/1; MG/1; MG/1
10 CAPSULE, EXTENDED RELEASE ORAL DAILY
Qty: 30 CAPSULE | Refills: 0 | Status: SHIPPED | OUTPATIENT
Start: 2022-09-08 | End: 2022-10-08

## 2022-09-08 RX ORDER — DEXTROAMPHETAMINE SACCHARATE, AMPHETAMINE ASPARTATE MONOHYDRATE, DEXTROAMPHETAMINE SULFATE AND AMPHETAMINE SULFATE 2.5; 2.5; 2.5; 2.5 MG/1; MG/1; MG/1; MG/1
10 CAPSULE, EXTENDED RELEASE ORAL DAILY
Qty: 30 CAPSULE | Refills: 0 | Status: SHIPPED | OUTPATIENT
Start: 2022-10-08 | End: 2022-11-07

## 2022-09-08 RX ORDER — ESCITALOPRAM OXALATE 20 MG/1
20 TABLET ORAL DAILY
Qty: 90 TABLET | Refills: 1 | Status: SHIPPED | OUTPATIENT
Start: 2022-09-08 | End: 2022-11-29 | Stop reason: SDUPTHER

## 2022-09-08 NOTE — PATIENT INSTRUCTIONS
PLAN:     Appt given for Nov 29 2022 4 pm Telehealth given  tho she indicating that she looking to move to  [facility] [place] where boyfriend  is currently living.     she instructed to set up with behavioral health over there in [place] as soon as possible.    One option may be     Quirino Butcher    [email or web address] as appears to use My Chart :    [place] [employer]   https://www.texmed.org/Search/Directory/Query/    is another possible source info as well as her own insurance provider or other of her choice.    Tho pt informed that this is but one potential option and she must do own due diligence;       Meds: see after visit summary renewed Adderall XR 10 mg and Lexapro 20 mg    References:     Relaxation stress reduction workbook: CALLUM Heath PhD ( used: $7-10)    Feeling Good Website: Elder Norris MD / [email or web address].[email or web address] website (free) / jayne. PODCASTS    Anxiety &  phobia workbook by MATTHEW Solo PhD  (web retailers: used: $ 7-10)    [place]: Path to Better Sleep : https://www.veterantraining.[place].gov/insomnia/ (free)     Pt expressed appreciation for the visit today and did not have further question at this time though pt  was still informed to:     Call  if problems.    Call / Report Side Effects to Psyc MD     Encouraged to follow up with primary care / Gen Med MD for continued monitoring of general health and wellness.    Understanding was expressed; and no further concerns nor questions were raised at this time.     remember healthy self care:   eat right  attempt adequate rest   HANDWASHING / encourage such jayne. During this corona virus time   walk or light exercise within reason and as your general med team approves  read or explore any of reference materials / homework mentioned  reach out (I.e.,  connect with)  others who nuture and bring out best in you  avoid risky behaviors  keep your appointments  IF you  cannot make your appt THEN please  call or go online to reschedule.  avoid  alcohol and illicit substances.  Look for the positive.  All is often relative-seek balance  Call sooner if needed : [phone]   Call 911 or go to Emergency Room  (ER)  if any acute concerns

## 2022-09-08 NOTE — PROGRESS NOTES
Disclaimer: Evaluation and treatment is based on information presented to date. Any new information may affect assessment and findings.     Each patient to whom  medical services are provided by telemedicine is: (1) informed of the relationship between the physician and patient and the respective role of any other health care provider with respect to management of the patient; and (2) notified that he or she may decline to receive medical services by telemedicine and may withdraw from such care at any time.    I also informed patient of the following:   Elder Whitley MD:  [id number]    Location:  [facility] her home    GENEVIEVE Whitley MD is employed by:  [employer]  [place]      If technology issues arise during call,  pt informed MD will attempt to call pt back or pt may call office phone: [phone]    Pt informed that IF acute concerns to: Dial 911 or go to nearest Emergency Room (ER)    If pt has questions related to privacy practices or records: pt instructed to contact [employer] Information Department: [phone]    Alexa Araujo   1995   09/08/2022      Father passing away  Feb 18 2019 Due to CIRRHOSIS ; she was one who mentioned ; handing ok; not tearful says she has supports:  fiance family friends     Note: she graduated [employer] 2022     S: Patient's Own Perception of Condition (& Side Effects) : no med complaints / likes meds / no side effects      O:      CURRENT PRESENTATION:     Smiling often     Taking Adderall XR 10 mg and Lexapro 20 mg / says meds helping / no side effects     Graduated  [employer] 2022    Planning to get  in Fall 2022and at some point move to [place], [place]; she is not sure of timeline  tho asked to contact clinic or messsage when moves tho will provide follow up appt in interim    cheerful disposition     alert calm     polite     goal directed    No SI /   No HI    Comments Medication: likes  her medication     Constitutional Health Concerns:       Laboratory Data  No visits with results within 1 Month(s) from this visit.   Latest known visit with results is:   Historical on 10/29/2021   Component Date Value Ref Range Status    Albumin/Globulin Ratio 10/29/2021 1.61  1.50 - 2.50 Final    Alanine Aminotransferase 10/29/2021 18.0  7.0 - 52.0 unit/L Final    Alkaline Phosphatase 10/29/2021 54.0  38.0 - 126.0 unit/L Final    Albumin Level 10/29/2021 4.5  3.4 - 5.0 gm/dL Final    Aspartate Aminotransferase 10/29/2021 27.0  15.0 - 37.0 unit/L Final    Bilirubin Total 10/29/2021 0.3  0.2 - 1.0 mg/dL Final    Blood Urea Nitrogen 10/29/2021 12.0  7.0 - 18.0 mg/dL Final    Bilirubin Direct 10/29/2021 0.1  0.0 - 0.5 mg/dL Final    Bilirubin Indirect 10/29/2021 0.2  mg/dL Final    Calcium Level Total 10/29/2021 9.3  8.5 - 10.0 mg/dL Final    Chloride 10/29/2021 102.0  98.0 - 107.0 mmol/L Final    Carbon Dioxide 10/29/2021 26.0  21.0 - 32.0 mmol/L Final    Creatinine 10/29/2021 0.56 (L)  0.60 - 1.30 mg/dL Final    Globulin 10/29/2021 2.8  1.2 - 3.0 gm/dL Final    Glucose Level 10/29/2021 102.0  74.0 - 106.0 mg/dL Final    Sodium Level 10/29/2021 138.0  136.0 - 145.0 mmol/L Final    Potassium Level 10/29/2021 4.4  3.5 - 5.1 mmol/L Final    Protein Total 10/29/2021 7.3  6.4 - 8.2 gm/dL Final    Cholesterol/HDL Ratio 10/29/2021 4.7   Final    Cholesterol Total 10/29/2021 253.0 (H)  0.0 - 200.0 mg/dL Final    HDL Cholesterol 10/29/2021 54.0  35.0 - 60.0 mg/dL Final    LDL Cholesterol 10/29/2021 172.0 (H)  0.0 - 129.0 mg/dL Final    Triglyceride 10/29/2021 51.0  30.0 - 150.0 mg/dL Final    Very Low Density Lipoprotein 10/29/2021 10.2   Final    Estimated Average Glucose 10/29/2021 100  <<=115 mg/dL Final    Hemoglobin A1c 10/29/2021 5.1  4.4 - 6.4 % Final    Thyroxine Free 10/29/2021 1.15  0.76 - 1.46 ng/dL Final    Thyroid Stimulating Hormone 10/29/2021 0.401  0.350 - 4.940 mIU/mL Final    Estimated GFR-Non   American 10/29/2021 >60  mL/min/1.73 m2 Final    Estimated GFR- 10/29/2021 >60  mL/min/1.73 m2 Final    Mono # 10/29/2021 0.6  0.1 - 1.3 x10(3)/mcL Final    Lymph # 10/29/2021 2.8  0.6 - 3.4 x10(3)/mcL Final    Lymph % 10/29/2021 34.1  13.0 - 40.0 % Final    Neut % 10/29/2021 59.0  47.0 - 80.0 % Final    Mono % 10/29/2021 6.9  0.1 - 24.0 % Final    Abs Neut 10/29/2021 4.8  2.1 - 9.2 x10(3)/mcL Final    MCH 10/29/2021 30.1  27.0 - 31.2 pg Final    MCHC 10/29/2021 33  32 - 36 gm/dL Final    MPV 10/29/2021 10.0  9.4 - 12.4 fL Final    MCV 10/29/2021 92  80 - 94 fL Final    Hct 10/29/2021 43.1  37.0 - 47.0 % Final    Hgb 10/29/2021 14.2  12.0 - 16.0 gm/dL Final    WBC 10/29/2021 8.2  4.5 - 11.5 x10(3)/mcL Final    RBC 10/29/2021 4.71  4.20 - 5.40 x10(6)/mcL Final    Platelet 10/29/2021 231  130 - 400 x10(3)/mcL Final    RDW 10/29/2021 12.4  11.5 - 17.0 % Final    Urobilinogen, UA 10/29/2021 0.2  >0.2 mg/dL Final    Squam Epithel, UA 10/29/2021 Rare  /LPF Final    Color, UA 10/29/2021 Yellow  >Yellow Final    Glucose, UA 10/29/2021 Negative  >Negative mg/dL Final    Appearance, UA 10/29/2021 Clear  >Clear Final    Ketones, UA 10/29/2021 Negative  >Negative mg/dL Final    Leukocytes, UA 10/29/2021 Negative  >Negative unit/L Final    pH, UA 10/29/2021 7.5  >5.0 - 8.0 Final    Bilirubin (UA) 10/29/2021 Negative  >Negative mg/dL Final    Specific Gravity,UA 10/29/2021 1.015  >1.005 - 1.030 Final    Occult Blood UA 10/29/2021 Negative  >Negative unit/L Final    RBC, UA 10/29/2021 None Seen  >0 - 3 /HPF Final    Protein, UA 10/29/2021 Negative  >Negative mg/dL Final        Mental Status Exam:      Appearance: casual   Oriented: x 3   Attitude: cooperative   Eye Contact: good  Behavior: calm smiling     Mood: good  Cognition: alert  Concentration: grossly intact   Affect: bright      Anxiety: mild     Thought Process: goal directed     Speech:       Volume : WNL       Quantity WNL       Quality: appears to  openly answer questions      Threats: no SI / no HI     Psychosis: denies all      Estimate of Intellectual Function: above average   Impulse Control: no thoughts of harm to self/ others      Musculoskeletal: no report of tremor      Social: engaged to pre med student  / graduating [employer] / has supportive mother and friends       Patient Active Problem List   Diagnosis    Generalized anxiety disorder with panic attacks    ADHD    Grief    Anxiety disorder    Panic attacks        Current Outpatient Medications:     dextroamphetamine-amphetamine (ADDERALL XR) 10 MG 24 hr capsule, Take 1 capsule (10 mg total) by mouth once daily., Disp: 30 capsule, Rfl: 0    [START ON 10/8/2022] dextroamphetamine-amphetamine (ADDERALL XR) 10 MG 24 hr capsule, Take 1 capsule (10 mg total) by mouth once daily., Disp: 30 capsule, Rfl: 0    [START ON 11/7/2022] dextroamphetamine-amphetamine (ADDERALL XR) 10 MG 24 hr capsule, Take 1 capsule (10 mg total) by mouth once daily., Disp: 30 capsule, Rfl: 0    EScitalopram oxalate (LEXAPRO) 20 MG tablet, Take 1 tablet (20 mg total) by mouth once daily., Disp: 90 tablet, Rfl: 1    ESTARYLLA 0.25-35 mg-mcg per tablet, Take 1 tablet by mouth once daily., Disp: , Rfl: 10     Social History     Tobacco Use   Smoking Status Unknown   Smokeless Tobacco Not on file        Review of patient's allergies indicates:  No Known Allergies     ASSESSMENT:   Encounter Diagnoses   Name Primary?    Anxiety disorder, unspecified type; some generalized features     Attention deficit hyperactivity disorder (ADHD), predominantly inattentive type        Patient Instructions     PLAN:     Appt given for Nov 29 2022 4 pm Telehealth given  tho she indicating that she looking to move to  [facility] ) where boyfriend  is currently living.     she instructed to set up with behavioral health over there in [place] as soon as possible.    One option may be     [facility] Butcher     [email or web address] as appears to use My Chart :    [place] [employer]   https://www.texmed.org/Search/Directory/Query/    is another possible source info as well as her own insurance provider or other of her choice.    Tho pt informed that this is but one potential option and she must do own due diligence;       Meds: see after visit summary renewed Adderall XR 10 mg and Lexapro 20 mg    References:     Relaxation stress reduction workbook: CALLUM Heath PhD ( used: $7-10)    Feeling Good Website: Elder Norris MD / www.Moku website (free) / jayne. PODCASTS    Anxiety &  phobia workbook by MATTHEW Solo PhD  (web retailers: used: $ 7-10)    [place]: Path to Better Sleep : https://www.veterantraining.[place].gov/insomnia/ (free)     Pt expressed appreciation for the visit today and did not have further question at this time though pt  was still informed to:     Call  if problems.    Call / Report Side Effects to Psyc MD     Encouraged to follow up with primary care / Gen Med MD for continued monitoring of general health and wellness.    Understanding was expressed; and no further concerns nor questions were raised at this time.     remember healthy self care:   eat right  attempt adequate rest   HANDWASHING / encourage such jayne. During this corona virus time   walk or light exercise within reason and as your general med team approves  read or explore any of reference materials / homework mentioned  reach out (I.e.,  connect with)  others who nuture and bring out best in you  avoid risky behaviors  keep your appointments  IF you  cannot make your appt THEN please call or go online to reschedule.  avoid  alcohol and illicit substances.  Look for the positive.  All is often relative-seek balance  Call sooner if needed : [phone]   Call 911 or go to Emergency Room  (ER)  if any acute concerns        >> BACKGROUND << (from 11-2-2021 Psyc Eval):    Referred by: (Whose idea to come  see Psychiatry) :  Admin transfer as Bozena Machuca MD left [facility]    CHIEF COMPLAINT :  Continuity of care / has been taking Adderall XR 10 mg and Lexapro 20 mg ; desires reflls of same    HISTORY:         Alexa Araujo a 26 y.o.  female presents today as referred by Bozena Machuca MD    Panic attack reports monthly x 10 min     Father passed away in February 18 2019 from stroke; dad had history cirrhosis. Pt had subsequently Has been struggling with problems with motivation, and getting up and going usually when her dad's death.    She has not opted for counselor tho such offerred yet she politely declines aware can re request.    Ms Van reports that she doing ok on med regimen; likes such.     Excerpt of  Bozena Machuca MD initial Psyc Eval of from: 1-2-2019 :    22 yo having problems with execution, she can't follow through.  Patient feels that she has anxiety and so overwhelmed that she can't stay on the task. Patient has a history of anxiety attacks, is on Celexa.     Patient has had problems with sleep, recently switched Celexa to the morning and sleeping better, since the switch. Unsure if there has been much benefit on the medication. 6 months to a year started having this problem. Father was diagnosed with cirrhosis in September 28 of this year (2018). Has a boyfriend for 2 1/2 years has a hard time balancing this with school. Started having difficulties in school after third semester because she could not balance work and school. Finds that she has a difficult time with motivation. Doesn't want to do anything, she just sleeps all day and watches television.     Mom said that as a child in school she burned a lot of excess energy in sports, mom remembers that she had to keep her on a tight schedule. Patient had conduct issues as a child she was restlessness and talk out of turn. Her school work was done really quickly, and a lot of mistakes when she was little.     Currently is  "a civil engineering major. Plan is to get a job after school.     Has mom demetri several friends     Panic Attacks: none in while (in past monthly)     Current Issues:   Graduated  [employer] degree; congratulations given!  grew up [place]  Engaged / [place]     Panic Attacks:     Frequency:monthly     No specific stressor      palpitations, pounding heart, or accelerated heart rate ?  y  sweating ? n  trembling or shaking ? y  sensations of shortness of breath or smothering ? y  feeling of choking ? n  chest pain or discomfort ? n  nausea or abdominal distress ?n  feeling dizzy, unsteady, lightheaded, or faint ? n  derealization (feelings of unreality) or depersonalization (being detached from oneself) ? n  fear of losing control ? n  fear of dying ? n  paresthesias (numbness or tingling sensations) ? n  chills or hot flushes ? y hot     Agoraphobia ? mild     Current Issues:    Says she soon to finish [employer] degree; grew up [place]     Self Rating Scales: (Such submitted for scanning) :      The [employer] Framework: a "bio-psycho-social" screening form for use as clinical raw data. / (submitted for scanning)      deepa Yip MD     Physical Abuse: N      Sexual abuse  N     Psychosis: n     Substance Use:     Alcohol: social rare      Cannabis :n     Tobacco: n     Cocaine: n  Benzo:n  Opioid: n  Ecstasy:n  Other:n     PAST PSYCHIATRIC HISTORY:      Inpatient: n   Outpatient: (incl. Primary Care): Bozena Alejandroyc MD     Allergy Review:   Review of patient's allergies indicates:  No Known Allergies         "

## 2022-10-29 PROBLEM — Z00.00 ENCOUNTER FOR WELLNESS EXAMINATION IN ADULT: Status: ACTIVE | Noted: 2022-10-29

## 2022-10-31 PROBLEM — Z28.21 IMMUNIZATION REFUSED: Status: ACTIVE | Noted: 2022-10-31

## 2022-11-29 ENCOUNTER — OFFICE VISIT (OUTPATIENT)
Dept: PSYCHIATRY | Facility: CLINIC | Age: 27
End: 2022-11-29
Payer: COMMERCIAL

## 2022-11-29 DIAGNOSIS — F41.9 ANXIETY DISORDER, UNSPECIFIED TYPE: ICD-10-CM

## 2022-11-29 DIAGNOSIS — F90.0 ATTENTION DEFICIT HYPERACTIVITY DISORDER (ADHD), PREDOMINANTLY INATTENTIVE TYPE: ICD-10-CM

## 2022-11-29 PROCEDURE — 99214 OFFICE O/P EST MOD 30 MIN: CPT | Mod: 95,,, | Performed by: PSYCHIATRY & NEUROLOGY

## 2022-11-29 PROCEDURE — 99214 PR OFFICE/OUTPT VISIT, EST, LEVL IV, 30-39 MIN: ICD-10-PCS | Mod: 95,,, | Performed by: PSYCHIATRY & NEUROLOGY

## 2022-11-29 RX ORDER — DEXTROAMPHETAMINE SACCHARATE, AMPHETAMINE ASPARTATE MONOHYDRATE, DEXTROAMPHETAMINE SULFATE AND AMPHETAMINE SULFATE 2.5; 2.5; 2.5; 2.5 MG/1; MG/1; MG/1; MG/1
10 CAPSULE, EXTENDED RELEASE ORAL DAILY
Qty: 30 CAPSULE | Refills: 0 | Status: SHIPPED | OUTPATIENT
Start: 2023-01-12 | End: 2023-02-13 | Stop reason: SDUPTHER

## 2022-11-29 RX ORDER — DEXTROAMPHETAMINE SACCHARATE, AMPHETAMINE ASPARTATE MONOHYDRATE, DEXTROAMPHETAMINE SULFATE AND AMPHETAMINE SULFATE 2.5; 2.5; 2.5; 2.5 MG/1; MG/1; MG/1; MG/1
10 CAPSULE, EXTENDED RELEASE ORAL DAILY
Qty: 30 CAPSULE | Refills: 0 | Status: SHIPPED | OUTPATIENT
Start: 2023-02-10 | End: 2023-03-12

## 2022-11-29 RX ORDER — ESCITALOPRAM OXALATE 20 MG/1
20 TABLET ORAL DAILY
Qty: 90 TABLET | Refills: 0 | Status: SHIPPED | OUTPATIENT
Start: 2022-11-29 | End: 2023-02-13 | Stop reason: SDUPTHER

## 2022-11-29 RX ORDER — DEXTROAMPHETAMINE SACCHARATE, AMPHETAMINE ASPARTATE MONOHYDRATE, DEXTROAMPHETAMINE SULFATE AND AMPHETAMINE SULFATE 2.5; 2.5; 2.5; 2.5 MG/1; MG/1; MG/1; MG/1
10 CAPSULE, EXTENDED RELEASE ORAL DAILY
Qty: 30 CAPSULE | Refills: 0 | Status: SHIPPED | OUTPATIENT
Start: 2022-12-13 | End: 2023-01-12

## 2022-11-29 NOTE — PATIENT INSTRUCTIONS
PLAN:   Follow Up  D Post MD Feb 13 2023 4:30p Telehealth     Planning to move to  [facility] [facility] [place] ) where boyfriend  is currently living.     she instructed to set up with behavioral health over there in [place] as soon as possible.    One option may be     [facility]irino Butcher    [email or web address] as appears to use My Chart :    [place] [employer]   https://www.texmed.org/Search/Directory/Query/    is another possible source info as well as her own insurance provider or other of her choice.    Tho pt informed that this is but one potential option and she must do own due diligence;       Meds: see after visit summary renewed Adderall XR 10 mg and Lexapro 20 mg    References:     Relaxation stress reduction workbook: CALLUM Heath PhD ( used: $7-10)    Feeling Good Website: Elder Norris MD / www.[email or web address] website (free) / jayne. PODCASTS    Anxiety &  phobia workbook by MATTHEW Solo PhD  (web retailers: used: $ 7-10)    [place]: Path to Better Sleep : https://www.veterantraining.[place].gov/insomnia/ (free)     Pt expressed appreciation for the visit today and did not have further question at this time though pt  was still informed to:     Call  if problems.    Call / Report Side Effects to Psyc MD     Encouraged to follow up with primary care / Gen Med MD for continued monitoring of general health and wellness.    Understanding was expressed; and no further concerns nor questions were raised at this time.     remember healthy self care:   eat right  attempt adequate rest   HANDWASHING / encourage such jayne. During this corona virus time   walk or light exercise within reason and as your general med team approves  read or explore any of reference materials / homework mentioned  reach out (I.e.,  connect with)  others who nuture and bring out best in you  avoid risky behaviors  keep your appointments  IF you  cannot make your appt THEN please call or go online to  reschedule.  avoid  alcohol and illicit substances.  Look for the positive.  All is often relative-seek balance  Call sooner if needed : [phone]   Call 911 or go to Emergency Room  (ER)  if any acute concerns

## 2022-11-29 NOTE — PROGRESS NOTES
Disclaimer: Evaluation and treatment is based on information presented to date. Any new information may affect assessment and findings.     Each patient to whom  medical services are provided by telemedicine is: (1) informed of the relationship between the physician and patient and the respective role of any other health care provider with respect to management of the patient; and (2) notified that he or she may decline to receive medical services by telemedicine and may withdraw from such care at any time.    I also informed patient of the following:   Elder Whitley MD:  [id number]    Location:  [facility] her home    GENEVIEVE Whitley MD is employed by:  [employer]  [place]      If technology issues arise during call,  pt informed MD will attempt to call pt back or pt may call office phone: [phone]    Pt informed that IF acute concerns to: Dial 911 or go to nearest Emergency Room (ER)    If pt has questions related to privacy practices or records: pt instructed to contact [facility] Information Department:     Alexa Araujo   1995   12/04/2022      Father passing away  Feb 18 2019 Due to CIRRHOSIS ; she was one who mentioned ; handing ok; not tearful says she has supports:  fiance family friends     Note: she graduated [employer] 2022     S: Patient's Own Perception of Condition (& Side Effects) : no med complaints / likes meds / no side effects      O:      CURRENT PRESENTATION:     Smiling often     Taking Adderall XR 10 mg and Lexapro 20 mg / says meds helping / no side effects     Graduated  [employer] 2022    Planning to get   and at some point move to [place], [place]; she is not sure of timeline so at present providing follow up appt tho was clar that IF moves to [facility] with behavioral health in [place]. Gave some referral info for her to consider (or other of her choice) as in pt information / wrap up     cheerful  disposition     alert calm     polite     goal directed    No SI /   No HI    Comments Medication: likes her medication     Constitutional Health Concerns:       Laboratory Data  Office Visit on 10/31/2022   Component Date Value Ref Range Status    Color, UA 10/31/2022 Yellow  Yellow, Light-Yellow, Dark Yellow, Mari, Straw Final    Appearance, UA 10/31/2022 Clear  Clear Final    Specific Gravity, UA 10/31/2022 1.015   Final    pH, UA 10/31/2022 7.5  5.0 - 8.5 Final    Protein, UA 10/31/2022 Negative  Negative mg/dL Final    Glucose, UA 10/31/2022 Negative  Negative, Normal mg/dL Final    Ketones, UA 10/31/2022 Negative  Negative mg/dL Final    Blood, UA 10/31/2022 Negative  Negative unit/L Final    Bilirubin, UA 10/31/2022 Negative  Negative mg/dL Final    Urobilinogen, UA 10/31/2022 0.2  0.2, 1.0, Normal mg/dL Final    Nitrites, UA 10/31/2022 Negative  Negative Final    Leukocyte Esterase, UA 10/31/2022 Negative  Negative unit/L Final    Cholesterol Total 10/31/2022 210 (H)  0 - 200 mg/dL Final    HDL Cholesterol 10/31/2022 57  35 - 60 mg/dL Final    Triglyceride 10/31/2022 55  30 - 150 mg/dL Final    LDL Cholesterol Direct 10/31/2022 124.0  <=129.0 mg/dL Final    Cholesterol/HDL Ratio 10/31/2022 4   Final    Very Low Density Lipoprotein 10/31/2022 11   Final    Sodium Level 10/31/2022 140  136 - 145 mmol/L Final    Potassium Level 10/31/2022 4.3  3.5 - 5.1 mmol/L Final    Chloride 10/31/2022 104  mmol/L Final    Carbon Dioxide 10/31/2022 26  21 - 32 mmol/L Final    Glucose Level 10/31/2022 94  74 - 106 mg/dL Final    Blood Urea Nitrogen 10/31/2022 11.0  7.0 - 18.0 mg/dL Final    Creatinine 10/31/2022 0.67  0.60 - 1.30 mg/dL Final    Calcium Level Total 10/31/2022 9.7  8.5 - 10.1 mg/dL Final    Protein Total 10/31/2022 6.9  6.4 - 8.2 gm/dL Final    Albumin Level 10/31/2022 4.6  3.4 - 5.0 gm/dL Final    Globulin 10/31/2022 2.3  1.2 - 3.0 gm/dL Final    Albumin/Globulin Ratio 10/31/2022 2.0  1.5 - 2.0 ratio Final     Bilirubin Total 10/31/2022 0.5  0.2 - 1.0 mg/dL Final    Alkaline Phosphatase 10/31/2022 63  38 - 126 unit/L Final    Alanine Aminotransferase 10/31/2022 12  7 - 52 unit/L Final    Aspartate Aminotransferase 10/31/2022 13 (L)  15 - 37 unit/L Final    eGFR 10/31/2022 >60  mls/min/1.73/m2 Final    WBC 10/31/2022 7.0  4.5 - 11.5 x10(3)/mcL Final    RBC 10/31/2022 4.89  4.20 - 5.40 x10(6)/mcL Final    Hgb 10/31/2022 14.4  12.0 - 16.0 gm/dL Final    Hct 10/31/2022 45.1  37.0 - 47.0 % Final    MCV 10/31/2022 92.2  80.0 - 94.0 fL Final    MCH 10/31/2022 29.4  27.0 - 31.0 pg Final    MCHC 10/31/2022 31.9 (L)  33.0 - 36.0 mg/dL Final    RDW 10/31/2022 12.3  11.5 - 17.0 % Final    Platelet 10/31/2022 194  130 - 400 x10(3)/mcL Final    MPV 10/31/2022 10.5 (H)  7.4 - 10.4 fL Final    Neut % 10/31/2022 51.8  % Final    Lymph % 10/31/2022 39.6  % Final    Mono % 10/31/2022 8.6  % Final    Lymph # 10/31/2022 2.8  0.6 - 4.6 x10(3)/mcL Final    Neut # 10/31/2022 3.6  2.1 - 9.2 x10(3)/mcL Final    Mono # 10/31/2022 0.6  0.1 - 1.3 x10(3)/mcL Final    Bacteria, UA 10/31/2022 None Seen  None Seen, Rare, Occasional /HPF Final    RBC, UA 10/31/2022 None Seen  None Seen, 0-2, 3-5, 0-5, >100 /HPF Final    WBC, UA 10/31/2022 0-2  None Seen, 0-2, 3-5, 0-5 /HPF Final    Squamous Epithelial Cells, UA 10/31/2022 Few (A)  None Seen, Rare, Occasional, Occ /HPF Final        Mental Status Exam:      Appearance: casual   Oriented: x 3   Attitude: cooperative   Eye Contact: good  Behavior: calm smiling     Mood: good  Cognition: alert  Concentration: grossly intact   Affect: bright      Anxiety: mild     Thought Process: goal directed     Speech:       Volume : WNL       Quantity WNL       Quality: appears to openly answer questions      Threats: no SI / no HI     Psychosis: denies all      Estimate of Intellectual Function: above average   Impulse Control: no thoughts of harm to self/ others      Musculoskeletal: no report of tremor      Social:  engaged to pre med student  / graduating [employer] / has supportive mother and friends       Patient Active Problem List   Diagnosis    Generalized anxiety disorder with panic attacks    ADHD    Grief    Encounter for wellness examination in adult    Immunization refused    Anxiety disorder        Current Outpatient Medications:     [START ON 12/13/2022] dextroamphetamine-amphetamine (ADDERALL XR) 10 MG 24 hr capsule, Take 1 capsule (10 mg total) by mouth once daily., Disp: 30 capsule, Rfl: 0    [START ON 1/12/2023] dextroamphetamine-amphetamine (ADDERALL XR) 10 MG 24 hr capsule, Take 1 capsule (10 mg total) by mouth once daily., Disp: 30 capsule, Rfl: 0    [START ON 2/10/2023] dextroamphetamine-amphetamine (ADDERALL XR) 10 MG 24 hr capsule, Take 1 capsule (10 mg total) by mouth once daily., Disp: 30 capsule, Rfl: 0    EScitalopram oxalate (LEXAPRO) 20 MG tablet, Take 1 tablet (20 mg total) by mouth once daily., Disp: 90 tablet, Rfl: 0    ESTARYLLA 0.25-35 mg-mcg per tablet, Take 1 tablet by mouth once daily., Disp: , Rfl: 10     Social History     Tobacco Use   Smoking Status Former    Years: 5.00    Types: Cigarettes   Smokeless Tobacco Never        Review of patient's allergies indicates:  No Known Allergies     ASSESSMENT:   Encounter Diagnoses   Name Primary?    Anxiety disorder, unspecified type; some generalized features     Attention deficit hyperactivity disorder (ADHD), predominantly inattentive type          Patient Instructions     PLAN:   Follow Up  D Post MD Feb 13 2023 4:30p Telehealth     Planning to move to  [facility] ([place]urb [place] ) where boyfriend  is currently living.     she instructed to set up with behavioral health over there in [place] as soon as possible.    One option may be     [facility]    [email or web address] as appears to use My Chart :    [place] [employer]   https://www.texmed.org/Search/Directory/Query/    is another  possible source info as well as her own insurance provider or other of her choice.    Tho pt informed that this is but one potential option and she must do own due diligence;       Meds: see after visit summary renewed Adderall XR 10 mg and Lexapro 20 mg    References:     Relaxation stress reduction workbook: CALLUM Heath PhD ( used: $7-10)    Feeling Good Website: Elder Norris MD / www.[employer] website (free) / jayne. PODCASTS    Anxiety &  phobia workbook by MATTHEW Solo PhD  (web retailers: used: $ 7-10)    [place]: Path to Better Sleep : https://www.veterantraining.[place].gov/insomnia/ (free)     Pt expressed appreciation for the visit today and did not have further question at this time though pt  was still informed to:     Call  if problems.    Call / Report Side Effects to Psyc MD     Encouraged to follow up with primary care / Gen Med MD for continued monitoring of general health and wellness.    Understanding was expressed; and no further concerns nor questions were raised at this time.     remember healthy self care:   eat right  attempt adequate rest   HANDWASHING / encourage such jayne. During this corona virus time   walk or light exercise within reason and as your general med team approves  read or explore any of reference materials / homework mentioned  reach out (I.e.,  connect with)  others who nuture and bring out best in you  avoid risky behaviors  keep your appointments  IF you  cannot make your appt THEN please call or go online to reschedule.  avoid  alcohol and illicit substances.  Look for the positive.  All is often relative-seek balance  Call sooner if needed : [phone]   Call 911 or go to Emergency Room  (ER)  if any acute concerns        >> BACKGROUND << (from 11-2-2021 Psyc Eval):    Referred by: (Whose idea to come see Psychiatry) :  Admin transfer as Bozena Machuca MD left [facility]    CHIEF COMPLAINT :  Continuity of care / has been taking Adderall XR 10 mg and Lexapro 20 mg ;  desires reflls of same    HISTORY:         Alexa Araujo a 26 y.o.  female presents today as referred by Bozena Machuca MD    Panic attack reports monthly x 10 min     Father passed away in February 18 2019 from stroke; dad had history cirrhosis. Pt had subsequently Has been struggling with problems with motivation, and getting up and going usually when her dad's death.    She has not opted for counselor tho such offerred yet she politely declines aware can re request.    Ms Araujo reports that she doing ok on med regimen; likes such.     Excerpt of  Bozena Machuca MD initial Psyc Eval of from: 1-2-2019 :    24 yo having problems with execution, she can't follow through.  Patient feels that she has anxiety and so overwhelmed that she can't stay on the task. Patient has a history of anxiety attacks, is on Celexa.     Patient has had problems with sleep, recently switched Celexa to the morning and sleeping better, since the switch. Unsure if there has been much benefit on the medication. 6 months to a year started having this problem. Father was diagnosed with cirrhosis in September 28 of this year (2018). Has a boyfriend for 2 1/2 years has a hard time balancing this with school. Started having difficulties in school after third semester because she could not balance work and school. Finds that she has a difficult time with motivation. Doesn't want to do anything, she just sleeps all day and watches television.     Mom said that as a child in school she burned a lot of excess energy in sports, mom remembers that she had to keep her on a tight schedule. Patient had conduct issues as a child she was restlessness and talk out of turn. Her school work was done really quickly, and a lot of mistakes when she was little.     Currently is a civil engineering major. Plan is to get a job after school.     Has mom mariahe several friends     Panic Attacks: none in while (in past monthly)     Current Issues:   " Graduated  \[employer]\"" Civil Engineering degree; congratulations given!  grew up [place]  Engaged / [place] lives [facility] area     Panic Attacks:     Frequency:monthly     No specific stressor      palpitations, pounding heart, or accelerated heart rate ?  y  sweating ? n  trembling or shaking ? y  sensations of shortness of breath or smothering ? y  feeling of choking ? n  chest pain or discomfort ? n  nausea or abdominal distress ?n  feeling dizzy, unsteady, lightheaded, or faint ? n  derealization (feelings of unreality) or depersonalization (being detached from oneself) ? n  fear of losing control ? n  fear of dying ? n  paresthesias (numbness or tingling sensations) ? n  chills or hot flushes ? y hot     Agoraphobia ? mild     Current Issues:    Says she soon to finish \[employer]\"" Civil Engineering degree; grew up [place]     Self Rating Scales: (Such submitted for scanning) :      [facility] Framework: a "bio-psycho-social" screening form for use as clinical raw data. / (submitted for scanning)      ysabel Yip MD     Physical Abuse: N      Sexual abuse  N     Psychosis: n     Substance Use:     Alcohol: social rare      Cannabis :n     Tobacco: n     Cocaine: n  Benzo:n  Opioid: n  Ecstasy:n  Other:n     PAST PSYCHIATRIC HISTORY:      Inpatient: n   Outpatient: (incl. Primary Care): Bozena Machuca MD     Allergy Review:   Review of patient's allergies indicates:  No Known Allergies         "

## 2022-12-04 PROBLEM — F41.9 ANXIETY DISORDER: Status: ACTIVE | Noted: 2022-12-04

## 2023-01-30 PROBLEM — Z00.00 ENCOUNTER FOR WELLNESS EXAMINATION IN ADULT: Status: RESOLVED | Noted: 2022-10-29 | Resolved: 2023-01-30

## 2023-02-13 ENCOUNTER — OFFICE VISIT (OUTPATIENT)
Dept: PSYCHIATRY | Facility: CLINIC | Age: 28
End: 2023-02-13
Payer: COMMERCIAL

## 2023-02-13 DIAGNOSIS — F41.9 ANXIETY DISORDER, UNSPECIFIED TYPE: Primary | ICD-10-CM

## 2023-02-13 DIAGNOSIS — F90.0 ATTENTION DEFICIT HYPERACTIVITY DISORDER (ADHD), PREDOMINANTLY INATTENTIVE TYPE: ICD-10-CM

## 2023-02-13 DIAGNOSIS — F43.21 GRIEF: ICD-10-CM

## 2023-02-13 DIAGNOSIS — F41.0 PANIC ATTACKS: ICD-10-CM

## 2023-02-13 PROCEDURE — 99214 OFFICE O/P EST MOD 30 MIN: CPT | Mod: 95,,, | Performed by: PSYCHIATRY & NEUROLOGY

## 2023-02-13 PROCEDURE — 99214 PR OFFICE/OUTPT VISIT, EST, LEVL IV, 30-39 MIN: ICD-10-PCS | Mod: 95,,, | Performed by: PSYCHIATRY & NEUROLOGY

## 2023-02-13 RX ORDER — ESCITALOPRAM OXALATE 20 MG/1
20 TABLET ORAL DAILY
Qty: 90 TABLET | Refills: 0 | Status: SHIPPED | OUTPATIENT
Start: 2023-02-13 | End: 2023-05-14

## 2023-02-13 RX ORDER — DEXTROAMPHETAMINE SACCHARATE, AMPHETAMINE ASPARTATE MONOHYDRATE, DEXTROAMPHETAMINE SULFATE AND AMPHETAMINE SULFATE 2.5; 2.5; 2.5; 2.5 MG/1; MG/1; MG/1; MG/1
10 CAPSULE, EXTENDED RELEASE ORAL DAILY
Qty: 30 CAPSULE | Refills: 0 | Status: SHIPPED | OUTPATIENT
Start: 2023-03-16 | End: 2023-04-15

## 2023-02-13 RX ORDER — DEXTROAMPHETAMINE SACCHARATE, AMPHETAMINE ASPARTATE MONOHYDRATE, DEXTROAMPHETAMINE SULFATE AND AMPHETAMINE SULFATE 2.5; 2.5; 2.5; 2.5 MG/1; MG/1; MG/1; MG/1
10 CAPSULE, EXTENDED RELEASE ORAL DAILY
Qty: 30 CAPSULE | Refills: 0 | Status: SHIPPED | OUTPATIENT
Start: 2023-04-14 | End: 2023-05-14

## 2023-02-13 RX ORDER — DEXTROAMPHETAMINE SACCHARATE, AMPHETAMINE ASPARTATE MONOHYDRATE, DEXTROAMPHETAMINE SULFATE AND AMPHETAMINE SULFATE 2.5; 2.5; 2.5; 2.5 MG/1; MG/1; MG/1; MG/1
10 CAPSULE, EXTENDED RELEASE ORAL DAILY
Qty: 30 CAPSULE | Refills: 0 | Status: SHIPPED | OUTPATIENT
Start: 2023-02-14 | End: 2023-03-16

## 2023-02-13 NOTE — PATIENT INSTRUCTIONS
PLAN:     D Post Brigette HOWE informed pt that he is moving to [facility] March 2023; noted that [facility]  will reassign pt  to another [place] based prescriber.     Informed pt that IF for some reason that no prescriber appt  in place by late Feb / (no later than March 1 2023) THEN pt needs to message or call [facility]  ([phone]) to ask to getting  on schedule for  [place] behavioral health prescriber Understanding Expressed    At some point will  move to  [facility] ) where boyfriend  is currently living; however says as mother passed away remaining her [place] for while    She will explore behavioral health providers in [place] area to plan  ahead for move some ideas are as below    [facility]    [email or web address] as appears to use My Chart :    [place] [employer]   https://www.texmed.org/Search/Directory/Query/    is another possible source of info as well as her own insurance provider or other of her provider of choice.    Tho pt informed that this is but one potential option and she must do own due diligence;     Meds: see after visit summary renewed Adderall XR 10 mg and Lexapro 20 mg    References:     Relaxation stress reduction workbook: CALLUM Heath PhD ( used: $7-10)    Feeling Good Website: Elder Norris MD / [email or web address] website (free) / jayne. PODCASTS    Anxiety &  phobia workbook by MATTHEW Solo PhD  (web retailers: used: $ 7-10)    [place]: Path to Better Sleep : https://www.veterantraining.[place].gov/insomnia/ (free)     Pt expressed appreciation for the visit today and did not have further question at this time though pt  was still informed to:     Call  if problems.    Call / Report Side Effects to Brigette HOWE     Encouraged to follow up with primary care / Gen Med MD for continued monitoring of general health and wellness.    Understanding was expressed; and no further  concerns nor questions were raised at this time.     remember healthy self care:   eat right  attempt adequate rest   HANDWASHING / encourage such jayne. During this corona virus time   walk or light exercise within reason and as your general med team approves  read or explore any of reference materials / homework mentioned  reach out (I.e.,  connect with)  others who nuture and bring out best in you  avoid risky behaviors  keep your appointments  IF you  cannot make your appt THEN please call or go online to reschedule.  avoid  alcohol and illicit substances.  Look for the positive.  All is often relative-seek balance  Call sooner if needed : [phone]   Call 911 or go to Emergency Room  (ER)  if any acute concerns

## 2023-03-07 ENCOUNTER — PATIENT MESSAGE (OUTPATIENT)
Dept: PSYCHIATRY | Facility: CLINIC | Age: 28
End: 2023-03-07
Payer: COMMERCIAL

## 2023-03-07 ENCOUNTER — TELEPHONE (OUTPATIENT)
Dept: PSYCHIATRY | Facility: CLINIC | Age: 28
End: 2023-03-07
Payer: COMMERCIAL

## 2023-03-22 ENCOUNTER — TELEPHONE (OUTPATIENT)
Dept: PSYCHIATRY | Facility: CLINIC | Age: 28
End: 2023-03-22
Payer: COMMERCIAL

## 2023-03-22 ENCOUNTER — PATIENT MESSAGE (OUTPATIENT)
Dept: PSYCHIATRY | Facility: CLINIC | Age: 28
End: 2023-03-22
Payer: COMMERCIAL

## 2023-03-24 ENCOUNTER — TELEPHONE (OUTPATIENT)
Dept: PSYCHIATRY | Facility: CLINIC | Age: 28
End: 2023-03-24
Payer: COMMERCIAL

## 2023-03-27 ENCOUNTER — TELEPHONE (OUTPATIENT)
Dept: PSYCHIATRY | Facility: CLINIC | Age: 28
End: 2023-03-27
Payer: COMMERCIAL

## 2024-02-10 NOTE — PROGRESS NOTES
Disclaimer: Evaluation and treatment is based on information presented to date. Any new information may affect assessment and findings.     Each patient to whom  medical services are provided by telemedicine is: (1) informed of the relationship between the physician and patient and the respective role of any other health care provider with respect to management of the patient; and (2) notified that he or she may decline to receive medical services by telemedicine and may withdraw from such care at any time.    I also informed patient of the following:   Elder Whitley MD:  [id number]    Location[place]  [place] home    GENEVIEVE Whitley MD is employed by:  [employer]  [place], [place]      If technology issues arise during call,  pt informed MD will attempt to call pt back or pt may call office phone: [phone]    Pt informed that IF acute concerns to: Dial 911 or go to nearest Emergency Room (ER)    If pt has questions related to privacy practices or records: pt instructed to contact [facility] Information Department:     Alexa Araujo   1995   02/13/2023        S: Patient's Own Perception of Condition (& Side Effects) : no med complaints / likes meds / no side effects      O:      CURRENT PRESENTATION:     Graduated  [employer] 2022 ; bryan grad [employer] kinesiology (he is applying to med school)     Mother (Mera) passed away Dec 3 2022 from CHF      Father passing away  Feb 18 2019 Due to CIRRHOSIS ; she was one who mentioned ; handing ok; not tearful says she has supports:  fiance family friends     Ms Araujo (Alexa) tends to be  mature for her age; upbeat ;she has supports from mom's family as well as boyfriend, who is applying to med school     Note: she graduated [employer] 2022    Taking Adderall XR 10 mg and Lexapro 20 mg / says meds helping / no side effects     Planning to get   and at some point move to [place], [place]; she is  Please schedule an appointment with your primary care doctor for reassessment.  Please take your anticoagulant medication as prescribed to treat your pulmonary embolism (blood clot) in your lungs.   not sure of timeline so at present providing follow up appt saima was clar that IF moves to [facility] with behavioral health in [place]. Gave some referral info for her to consider (or other of her choice) as in pt information / wrap up     alert calm     polite     goal directed    No SI /   No HI    Comments Medication: likes her medication     Constitutional Health Concerns:       Laboratory Data  No visits with results within 1 Month(s) from this visit.   Latest known visit with results is:   Office Visit on 10/31/2022   Component Date Value Ref Range Status    Color, UA 10/31/2022 Yellow  Yellow, Light-Yellow, Dark Yellow, Mari, Straw Final    Appearance, UA 10/31/2022 Clear  Clear Final    Specific Gravity, UA 10/31/2022 1.015   Final    pH, UA 10/31/2022 7.5  5.0 - 8.5 Final    Protein, UA 10/31/2022 Negative  Negative mg/dL Final    Glucose, UA 10/31/2022 Negative  Negative, Normal mg/dL Final    Ketones, UA 10/31/2022 Negative  Negative mg/dL Final    Blood, UA 10/31/2022 Negative  Negative unit/L Final    Bilirubin, UA 10/31/2022 Negative  Negative mg/dL Final    Urobilinogen, UA 10/31/2022 0.2  0.2, 1.0, Normal mg/dL Final    Nitrites, UA 10/31/2022 Negative  Negative Final    Leukocyte Esterase, UA 10/31/2022 Negative  Negative unit/L Final    Cholesterol Total 10/31/2022 210 (H)  0 - 200 mg/dL Final    HDL Cholesterol 10/31/2022 57  35 - 60 mg/dL Final    Triglyceride 10/31/2022 55  30 - 150 mg/dL Final    LDL Cholesterol Direct 10/31/2022 124.0  <=129.0 mg/dL Final    Cholesterol/HDL Ratio 10/31/2022 4   Final    Very Low Density Lipoprotein 10/31/2022 11   Final    Sodium Level 10/31/2022 140  136 - 145 mmol/L Final    Potassium Level 10/31/2022 4.3  3.5 - 5.1 mmol/L Final    Chloride 10/31/2022 104  mmol/L Final    Carbon Dioxide 10/31/2022 26  21 - 32 mmol/L Final    Glucose Level 10/31/2022 94  74 - 106 mg/dL Final    Blood Urea Nitrogen 10/31/2022 11.0  7.0 - 18.0 mg/dL Final    Creatinine 10/31/2022  0.67  0.60 - 1.30 mg/dL Final    Calcium Level Total 10/31/2022 9.7  8.5 - 10.1 mg/dL Final    Protein Total 10/31/2022 6.9  6.4 - 8.2 gm/dL Final    Albumin Level 10/31/2022 4.6  3.4 - 5.0 gm/dL Final    Globulin 10/31/2022 2.3  1.2 - 3.0 gm/dL Final    Albumin/Globulin Ratio 10/31/2022 2.0  1.5 - 2.0 ratio Final    Bilirubin Total 10/31/2022 0.5  0.2 - 1.0 mg/dL Final    Alkaline Phosphatase 10/31/2022 63  38 - 126 unit/L Final    Alanine Aminotransferase 10/31/2022 12  7 - 52 unit/L Final    Aspartate Aminotransferase 10/31/2022 13 (L)  15 - 37 unit/L Final    eGFR 10/31/2022 >60  mls/min/1.73/m2 Final    WBC 10/31/2022 7.0  4.5 - 11.5 x10(3)/mcL Final    RBC 10/31/2022 4.89  4.20 - 5.40 x10(6)/mcL Final    Hgb 10/31/2022 14.4  12.0 - 16.0 gm/dL Final    Hct 10/31/2022 45.1  37.0 - 47.0 % Final    MCV 10/31/2022 92.2  80.0 - 94.0 fL Final    MCH 10/31/2022 29.4  27.0 - 31.0 pg Final    MCHC 10/31/2022 31.9 (L)  33.0 - 36.0 mg/dL Final    RDW 10/31/2022 12.3  11.5 - 17.0 % Final    Platelet 10/31/2022 194  130 - 400 x10(3)/mcL Final    MPV 10/31/2022 10.5 (H)  7.4 - 10.4 fL Final    Neut % 10/31/2022 51.8  % Final    Lymph % 10/31/2022 39.6  % Final    Mono % 10/31/2022 8.6  % Final    Lymph # 10/31/2022 2.8  0.6 - 4.6 x10(3)/mcL Final    Neut # 10/31/2022 3.6  2.1 - 9.2 x10(3)/mcL Final    Mono # 10/31/2022 0.6  0.1 - 1.3 x10(3)/mcL Final    Bacteria, UA 10/31/2022 None Seen  None Seen, Rare, Occasional /HPF Final    RBC, UA 10/31/2022 None Seen  None Seen, 0-2, 3-5, 0-5, >100 /HPF Final    WBC, UA 10/31/2022 0-2  None Seen, 0-2, 3-5, 0-5 /HPF Final    Squamous Epithelial Cells, UA 10/31/2022 Few (A)  None Seen, Rare, Occasional, Occ /HPF Final        Mental Status Exam:      Appearance: casual   Oriented: x 3   Attitude: cooperative   Eye Contact: good  Behavior: calm smiling     Mood: ok ; appropriate grief  Cognition: alert  Concentration: grossly intact   Affect: bright      Anxiety: mild     Thought Process:  goal directed     Speech:       Volume : WNL       Quantity WNL       Quality: appears to openly answer questions      Threats: no SI / no HI     Psychosis: denies all      Estimate of Intellectual Function: above average   Impulse Control: no thoughts of harm to self/ others      Musculoskeletal: no report of tremor      Social: engaged ;  graduating [employer] / mom passed away Dec 2022 (CHF)  and dad passed away 2019 (Cirrhosis) tho pt says mother 's family and friends are very supportive      Patient Active Problem List   Diagnosis    Generalized anxiety disorder with panic attacks    ADHD    Grief    Immunization refused    Anxiety disorder        Current Outpatient Medications:     dextroamphetamine-amphetamine (ADDERALL XR) 10 MG 24 hr capsule, Take 1 capsule (10 mg total) by mouth once daily., Disp: 30 capsule, Rfl: 0    [START ON 2/14/2023] dextroamphetamine-amphetamine (ADDERALL XR) 10 MG 24 hr capsule, Take 1 capsule (10 mg total) by mouth once daily., Disp: 30 capsule, Rfl: 0    [START ON 3/16/2023] dextroamphetamine-amphetamine (ADDERALL XR) 10 MG 24 hr capsule, Take 1 capsule (10 mg total) by mouth once daily., Disp: 30 capsule, Rfl: 0    [START ON 4/14/2023] dextroamphetamine-amphetamine (ADDERALL XR) 10 MG 24 hr capsule, Take 1 capsule (10 mg total) by mouth once daily., Disp: 30 capsule, Rfl: 0    EScitalopram oxalate (LEXAPRO) 20 MG tablet, Take 1 tablet (20 mg total) by mouth once daily., Disp: 90 tablet, Rfl: 0    ESTARYLLA 0.25-35 mg-mcg per tablet, Take 1 tablet by mouth once daily., Disp: , Rfl: 10     Social History     Tobacco Use   Smoking Status Former    Years: 5.00    Types: Cigarettes   Smokeless Tobacco Never        Review of patient's allergies indicates:  No Known Allergies     ASSESSMENT:   Encounter Diagnoses   Name Primary?    Anxiety disorder, unspecified type; some generalized features Yes    Panic attacks     Attention deficit hyperactivity disorder (ADHD), predominantly  inattentive type     Grief: Mom passed away Dec 2 2022 (CHF); Father passed away Feb 18 2019 of cirrhosis        Patient Instructions     PLAN:     D Post Brigette HOWE informed pt that he is moving to [facility] March 2023; noted that [facility]  will reassign pt  to another [facility] based prescriber.     Informed pt that IF for some reason that no prescriber appt  in place by late Feb / (no later than March 1 2023) THEN pt needs to message or call [facility]  ([phone]) to ask to getting  on schedule for  [facility] prescriber Understanding Expressed    At some point will  move to  [facility] (suburb of [place] ) where boyfriend  is currently living; however says as mother passed away remaining her [place] for while    She will explore behavioral health providers in [facility] to plan  ahead for move some ideas are as below    [facility]    [email or web address] as appears to use My Chart :    [place] [facility] [employer]   https://www.texmed.org/Search/Directory/Query/    is another possible source of info as well as her own insurance provider or other of her provider of choice.    Tho pt informed that this is but one potential option and she must do own due diligence;     Meds: see after visit summary renewed Adderall XR 10 mg and Lexapro 20 mg    References:     Relaxation stress reduction workbook: CALLUM Heath PhD ( used: $7-10)    Feeling Good Website: Elder Norris MD / [email or web address] website (free) / jayne. PODCASTS    Anxiety &  phobia workbook by MATTHEW Solo PhD  (web retailers: used: $ 7-10)    [place]: Path to Better Sleep : https://www.veterantraining.va.gov/insomnia/ (free)     Pt expressed appreciation for the visit today and did not have further question at this time though pt  was still informed to:     Call  if problems.    Call / Report Side Effects to Brigette HOWE     Encouraged to follow up  with primary care / Gen Med MD for continued monitoring of general health and wellness.    Understanding was expressed; and no further concerns nor questions were raised at this time.     remember healthy self care:   eat right  attempt adequate rest   HANDWASHING / encourage such jayne. During this corona virus time   walk or light exercise within reason and as your general med team approves  read or explore any of reference materials / homework mentioned  reach out (I.e.,  connect with)  others who nuture and bring out best in you  avoid risky behaviors  keep your appointments  IF you  cannot make your appt THEN please call or go online to reschedule.  avoid  alcohol and illicit substances.  Look for the positive.  All is often relative-seek balance  Call sooner if needed : [phone]   Call 911 or go to Emergency Room  (ER)  if any acute concerns          >> BACKGROUND << (from 11-2-2021 Psyc Eval):    Referred by: (Whose idea to come see Psychiatry) :  Admin transfer as Bozena Machuca MD left [facility]    CHIEF COMPLAINT :  Continuity of care / has been taking Adderall XR 10 mg and Lexapro 20 mg ; desires reflls of same    HISTORY:         Alexa Araujo a 26 y.o.  female presents today as referred by Bozena Machuca MD    Panic attack reports monthly x 10 min     Father passed away in February 18 2019 from stroke; dad had history cirrhosis. Pt had subsequently Has been struggling with problems with motivation, and getting up and going usually when her dad's death.    She has not opted for counselor tho such offerred yet she politely declines aware can re request.    Ms Araujo reports that she doing ok on med regimen; likes such.     Excerpt of  Bozena Machuca MD initial Psyc Eval of from: 1-2-2019 :    24 yo having problems with execution, she can't follow through.  Patient feels that she has anxiety and so overwhelmed that she can't stay on the task. Patient has a history of anxiety  attacks, is on Celexa.     Patient has had problems with sleep, recently switched Celexa to the morning and sleeping better, since the switch. Unsure if there has been much benefit on the medication. 6 months to a year started having this problem. Father was diagnosed with cirrhosis in September 28 of this year (2018). Has a boyfriend for 2 1/2 years has a hard time balancing this with school. Started having difficulties in school after third semester because she could not balance work and school. Finds that she has a difficult time with motivation. Doesn't want to do anything, she just sleeps all day and watches television.     Mom said that as a child in school she burned a lot of excess energy in sports, mom remembers that she had to keep her on a tight schedule. Patient had conduct issues as a child she was restlessness and talk out of turn. Her school work was done really quickly, and a lot of mistakes when she was little.     Currently is a civil engineering major. Plan is to get a job after school.     Has mom bryan several friends     Panic Attacks: none in while (in past monthly)     Current Issues:   Graduated  [employer] Civil [employer] degree; congratulations given!  grew up [place] / [place] lives [facility]     Panic Attacks:     Frequency:monthly     No specific stressor      palpitations, pounding heart, or accelerated heart rate ?  y  sweating ? n  trembling or shaking ? y  sensations of shortness of breath or smothering ? y  feeling of choking ? n  chest pain or discomfort ? n  nausea or abdominal distress ?n  feeling dizzy, unsteady, lightheaded, or faint ? n  derealization (feelings of unreality) or depersonalization (being detached from oneself) ? n  fear of losing control ? n  fear of dying ? n  paresthesias (numbness or tingling sensations) ? n  chills or hot flushes ? y hot     Agoraphobia ? mild     Current Issues:    Says she soon to finish [employer] Civil [employer] degree; grew  "up [place]     Self Rating Scales: (Such submitted for scanning) :      [employer] Framework: a "bio-psycho-social" screening form for use as clinical raw data. / (submitted for scanning)      ysabel Yip MD     Physical Abuse: N      Sexual abuse  N     Psychosis: n     Substance Use:     Alcohol: social rare      Cannabis :n     Tobacco: n     Cocaine: n  Benzo:n  Opioid: n  Ecstasy:n  Other:n     PAST PSYCHIATRIC HISTORY:      Inpatient: n   Outpatient: (incl. Primary Care): Bozena Simpson Psjaqueline HOWE     Allergy Review:   Review of patient's allergies indicates:  No Known Allergies         "